# Patient Record
Sex: FEMALE | Race: WHITE | NOT HISPANIC OR LATINO | Employment: OTHER | URBAN - METROPOLITAN AREA
[De-identification: names, ages, dates, MRNs, and addresses within clinical notes are randomized per-mention and may not be internally consistent; named-entity substitution may affect disease eponyms.]

---

## 2017-02-08 ENCOUNTER — OFFICE VISIT (OUTPATIENT)
Dept: CARDIOLOGY | Facility: CLINIC | Age: 77
End: 2017-02-08

## 2017-02-08 VITALS
BODY MASS INDEX: 34.56 KG/M2 | HEIGHT: 68 IN | HEART RATE: 68 BPM | SYSTOLIC BLOOD PRESSURE: 128 MMHG | DIASTOLIC BLOOD PRESSURE: 82 MMHG | WEIGHT: 228 LBS

## 2017-02-08 DIAGNOSIS — I25.9 ISCHEMIC HEART DISEASE: Primary | ICD-10-CM

## 2017-02-08 PROCEDURE — 99213 OFFICE O/P EST LOW 20 MIN: CPT | Performed by: INTERNAL MEDICINE

## 2017-02-08 PROCEDURE — 93000 ELECTROCARDIOGRAM COMPLETE: CPT | Performed by: INTERNAL MEDICINE

## 2017-02-08 NOTE — PROGRESS NOTES
Date of Office Visit: 2017  Encounter Provider: Chintan Sinha MD  Place of Service: [facility] CARDIOLOGY  Patient Name: Mellisa Sheldon  :1940      Chief Complaint   Patient presents with   • Coronary Artery Disease     History of Present Illness  The patient is a 76-year-old white female with a history of coronary artery disease.  His had previous angioplasty and drug-eluting stent to the right coronary artery and a bare metal stent to the left anterior descending coronary artery.  Her overall left ventricular function is normal.  Since her last visit a year ago she has not had any complaints of angina pectoris.  She's had several falls resulting in injuries but none of these were associated with any syncopal or near syncopal events.  She generally does not complain of any palpitations.  She does not complain of lightheadedness nor dizziness.  However she does state that she had dizzy spells when she was taking atorvastatin and since she stopped this several longer an issue.      Past Medical History   Diagnosis Date   • GERD (gastroesophageal reflux disease)    • Hyperlipidemia    • Ischemic cardiomyopathy    • Myocardial infarction          Past Surgical History   Procedure Laterality Date   • Coronary angioplasty       angioplasties of the LAD and the right coronary artery both with stent placements           Current Outpatient Prescriptions:   •  aspirin 325 MG tablet, Take by mouth daily., Disp: , Rfl:   •  atorvastatin (LIPITOR) 20 MG tablet, Take 1 tablet by mouth nightly., Disp: , Rfl:   •  carvedilol (COREG) 6.25 MG tablet, TAKE ONE TABLET BY MOUTH TWICE A DAY, Disp: 60 tablet, Rfl: 4  •  isosorbide mononitrate (IMDUR) 30 MG 24 hr tablet, TAKE ONE TABLET BY MOUTH DAILY, Disp: 30 tablet, Rfl: 5  •  Multiple Vitamins-Minerals (PRESERVISION AREDS PO), Take  by mouth., Disp: , Rfl:   •  nitroglycerin (NITROSTAT) 0.4 MG SL tablet, Place under the tongue.,  "Disp: , Rfl:   •  pantoprazole (PROTONIX) 20 MG EC tablet, Take 1 tablet by mouth 2 (two) times a day. WILL NEED TO GET FUTURE REFILLS THROUGH PCP., Disp: 60 tablet, Rfl: 0  •  Pyridoxine HCl (VITAMIN B-6 PO), Take by mouth daily., Disp: , Rfl:       Social History     Social History   • Marital status:      Spouse name: N/A   • Number of children: N/A   • Years of education: N/A     Occupational History   • Not on file.     Social History Main Topics   • Smoking status: Former Smoker     Packs/day: 2.00     Years: 40.00     Types: Cigarettes     Quit date: 10/14/2009   • Smokeless tobacco: Never Used   • Alcohol use No   • Drug use: No   • Sexual activity: Not on file     Other Topics Concern   • Not on file     Social History Narrative         Review of Systems   Constitution: Positive for malaise/fatigue.   HENT: Negative.    Eyes: Negative.    Cardiovascular: Negative.    Respiratory: Negative.    Endocrine: Negative.    Skin: Negative.    Musculoskeletal: Negative.    Gastrointestinal: Negative.    Neurological: Negative.    Psychiatric/Behavioral: Negative.        Procedures      ECG 12 Lead  Date/Time: 2/8/2017 12:39 PM  Performed by: RASHIDA STOCKTON  Authorized by: RASHIDA STOCKTON   Comparison: compared with previous ECG from 2/4/2016  Similar to previous ECG  Rhythm: sinus rhythm  Rate: normal  QRS axis: normal  Other findings: PRWP               Objective:      Visit Vitals   • /82   • Pulse 68   • Ht 68\" (172.7 cm)   • Wt 228 lb (103 kg)   • BMI 34.67 kg/m2           Physical Exam   Constitutional: She is oriented to person, place, and time. She appears well-developed and well-nourished.   HENT:   Head: Normocephalic.   Eyes: Pupils are equal, round, and reactive to light.   Neck: Normal range of motion. No JVD present. Carotid bruit is not present. No thyromegaly present.   Cardiovascular: Normal rate, regular rhythm, S1 normal, S2 normal, normal heart sounds and intact distal " pulses.  Exam reveals no gallop and no friction rub.    No murmur heard.  Pulmonary/Chest: Effort normal and breath sounds normal.   Abdominal: Soft. Bowel sounds are normal.   Musculoskeletal: She exhibits no edema.   Neurological: She is alert and oriented to person, place, and time.   Skin: Skin is warm, dry and intact. No erythema.   Psychiatric: She has a normal mood and affect.   Vitals reviewed.          Assessment:       Diagnosis Plan   1. Ischemic heart disease       Coronary Artery Disease  Assessment  • The patient has no angina    Subjective - Objective  • There has been a previous stent procedure using DRE RCA  • There has been a previous stent procedure using BMS LAD             Plan:    No change in medication.  The patient will follow back up in a year

## 2017-03-21 RX ORDER — ISOSORBIDE MONONITRATE 30 MG/1
TABLET, EXTENDED RELEASE ORAL
Qty: 30 TABLET | Refills: 5 | Status: SHIPPED | OUTPATIENT
Start: 2017-03-21 | End: 2017-09-29 | Stop reason: SDUPTHER

## 2017-03-21 RX ORDER — CARVEDILOL 6.25 MG/1
TABLET ORAL
Qty: 60 TABLET | Refills: 5 | Status: SHIPPED | OUTPATIENT
Start: 2017-03-21 | End: 2017-09-29 | Stop reason: SDUPTHER

## 2017-10-02 RX ORDER — ISOSORBIDE MONONITRATE 30 MG/1
TABLET, EXTENDED RELEASE ORAL
Qty: 30 TABLET | Refills: 5 | Status: SHIPPED | OUTPATIENT
Start: 2017-10-02 | End: 2018-04-03 | Stop reason: SDUPTHER

## 2017-10-02 RX ORDER — CARVEDILOL 6.25 MG/1
TABLET ORAL
Qty: 60 TABLET | Refills: 5 | Status: SHIPPED | OUTPATIENT
Start: 2017-10-02 | End: 2018-04-03 | Stop reason: SDUPTHER

## 2017-12-22 ENCOUNTER — APPOINTMENT (OUTPATIENT)
Dept: WOMENS IMAGING | Facility: HOSPITAL | Age: 77
End: 2017-12-22

## 2017-12-22 PROCEDURE — G0202 SCR MAMMO BI INCL CAD: HCPCS | Performed by: RADIOLOGY

## 2017-12-22 PROCEDURE — G0279 TOMOSYNTHESIS, MAMMO: HCPCS | Performed by: RADIOLOGY

## 2017-12-22 PROCEDURE — 77063 BREAST TOMOSYNTHESIS BI: CPT | Performed by: RADIOLOGY

## 2018-02-08 ENCOUNTER — OFFICE VISIT (OUTPATIENT)
Dept: CARDIOLOGY | Facility: CLINIC | Age: 78
End: 2018-02-08

## 2018-02-08 VITALS
BODY MASS INDEX: 34.56 KG/M2 | HEIGHT: 68 IN | DIASTOLIC BLOOD PRESSURE: 82 MMHG | WEIGHT: 228 LBS | SYSTOLIC BLOOD PRESSURE: 124 MMHG | HEART RATE: 62 BPM

## 2018-02-08 DIAGNOSIS — I25.9 ISCHEMIC HEART DISEASE: Primary | ICD-10-CM

## 2018-02-08 PROCEDURE — 99213 OFFICE O/P EST LOW 20 MIN: CPT | Performed by: INTERNAL MEDICINE

## 2018-02-08 PROCEDURE — 93000 ELECTROCARDIOGRAM COMPLETE: CPT | Performed by: INTERNAL MEDICINE

## 2018-02-08 NOTE — PROGRESS NOTES
Date of Office Visit: 2018  Encounter Provider: Chintan Sinha MD  [facility] of Service[facility]  Patient Name: Mellisa Sheldon  :1940      Chief Complaint   Patient presents with   • ISCHEMIC HEART DISEASE   • Shortness of Breath     History of Present Illness    The patient is a 27-year-old white female with a history of coronary artery disease.  She has had previous angioplasty with a drug-eluting stent to the right coronary artery and a bare metal stent placed to the left anterior descending coronary artery her left ventricular function overall has remained normal.  She does not complain of any angina pectoris at this time.  In fact she states that she has been feeling reasonably well.  She denies any orthopnea, paroxysmal nocturnal dyspnea, lightheadedness nor dizziness.    Past Medical History:   Diagnosis Date   • GERD (gastroesophageal reflux disease)    • Hyperlipidemia    • Ischemic cardiomyopathy    • Myocardial infarction          Past Surgical History:   Procedure Laterality Date   • CORONARY ANGIOPLASTY      angioplasties of the LAD and the right coronary artery both with stent placements           Current Outpatient Prescriptions:   •  aspirin 325 MG tablet, Take by mouth daily., Disp: , Rfl:   •  carvedilol (COREG) 6.25 MG tablet, TAKE ONE TABLET BY MOUTH TWICE A DAY, Disp: 60 tablet, Rfl: 5  •  isosorbide mononitrate (IMDUR) 30 MG 24 hr tablet, TAKE ONE TABLET BY MOUTH DAILY, Disp: 30 tablet, Rfl: 5  •  Multiple Vitamins-Minerals (PRESERVISION AREDS PO), Take  by mouth., Disp: , Rfl:   •  nitroglycerin (NITROSTAT) 0.4 MG SL tablet, Place under the tongue., Disp: , Rfl:   •  pantoprazole (PROTONIX) 20 MG EC tablet, Take 1 tablet by mouth 2 (two) times a day. WILL NEED TO GET FUTURE REFILLS THROUGH PCP., Disp: 60 tablet, Rfl: 0  •  Pyridoxine HCl (VITAMIN B-6 PO), Take by mouth daily., Disp: , Rfl:       Social History     Social History   •  "Marital status:      Spouse name: N/A   • Number of children: N/A   • Years of education: N/A     Occupational History   • Not on file.     Social History Main Topics   • Smoking status: Former Smoker     Packs/day: 2.00     Years: 40.00     Types: Cigarettes     Quit date: 10/14/2009   • Smokeless tobacco: Never Used   • Alcohol use No   • Drug use: No   • Sexual activity: Not on file     Other Topics Concern   • Not on file     Social History Narrative         Review of Systems   Constitution: Negative.   HENT: Negative.    Eyes: Negative.    Cardiovascular: Positive for dyspnea on exertion.   Respiratory: Negative.    Endocrine: Negative.    Skin: Negative.    Musculoskeletal: Negative.    Gastrointestinal: Negative.    Neurological: Negative.    Psychiatric/Behavioral: Negative.        Procedures      ECG 12 Lead  Date/Time: 2/8/2018 1:04 PM  Performed by: RASHIDA STOCKTON  Authorized by: RASHIDA STOCKTON   Comparison: compared with previous ECG from 2/8/2017  Similar to previous ECG  Rhythm: sinus rhythm  Rate: normal  Conduction: conduction normal  QRS axis: normal  Other findings: PRWP               Objective:    /82 (BP Location: Right arm, Patient Position: Sitting)  Pulse 62  Ht 172.7 cm (68\")  Wt 103 kg (228 lb)  BMI 34.67 kg/m2        Physical Exam   Constitutional: She is oriented to person, place, and time. She appears well-developed and well-nourished.   HENT:   Head: Normocephalic.   Eyes: Pupils are equal, round, and reactive to light.   Neck: Normal range of motion. No JVD present. Carotid bruit is not present. No thyromegaly present.   Cardiovascular: Normal rate, regular rhythm, S1 normal, S2 normal, normal heart sounds and intact distal pulses.  Exam reveals no gallop and no friction rub.    No murmur heard.  Pulmonary/Chest: Effort normal and breath sounds normal.   Abdominal: Soft. Bowel sounds are normal.   Musculoskeletal: She exhibits no edema.   Neurological: She is " alert and oriented to person, place, and time.   Skin: Skin is warm, dry and intact. No erythema.   Psychiatric: She has a normal mood and affect.   Vitals reviewed.          Assessment:       Diagnosis Plan   1. Ischemic heart disease       Coronary Artery Disease  Assessment  • The patient has no angina    Plan  • Lifestyle modifications discussed include adhering to a heart healthy diet, regular exercise and regular monitoring of cholesterol and blood pressure    Subjective - Objective  • There has been a previous stent procedure using DRE RCA  • There has been a previous stent procedure using BMS LAD  • Current antiplatelet therapy includes aspirin 81 mg           Plan:     No change at this time.  I will see her back in a year.

## 2018-04-03 RX ORDER — ISOSORBIDE MONONITRATE 30 MG/1
TABLET, EXTENDED RELEASE ORAL
Qty: 30 TABLET | Refills: 11 | Status: SHIPPED | OUTPATIENT
Start: 2018-04-03 | End: 2019-04-05 | Stop reason: SDUPTHER

## 2018-04-03 RX ORDER — CARVEDILOL 6.25 MG/1
TABLET ORAL
Qty: 60 TABLET | Refills: 11 | Status: SHIPPED | OUTPATIENT
Start: 2018-04-03 | End: 2019-04-19 | Stop reason: SDUPTHER

## 2018-12-28 ENCOUNTER — APPOINTMENT (OUTPATIENT)
Dept: WOMENS IMAGING | Facility: HOSPITAL | Age: 78
End: 2018-12-28

## 2018-12-28 PROCEDURE — 77063 BREAST TOMOSYNTHESIS BI: CPT | Performed by: RADIOLOGY

## 2018-12-28 PROCEDURE — 77067 SCR MAMMO BI INCL CAD: CPT | Performed by: RADIOLOGY

## 2019-02-13 ENCOUNTER — OFFICE VISIT (OUTPATIENT)
Dept: CARDIOLOGY | Facility: CLINIC | Age: 79
End: 2019-02-13

## 2019-02-13 VITALS
HEIGHT: 68 IN | HEART RATE: 87 BPM | WEIGHT: 234.4 LBS | SYSTOLIC BLOOD PRESSURE: 142 MMHG | DIASTOLIC BLOOD PRESSURE: 76 MMHG | BODY MASS INDEX: 35.52 KG/M2 | OXYGEN SATURATION: 97 %

## 2019-02-13 DIAGNOSIS — I25.10 CORONARY ARTERY DISEASE INVOLVING NATIVE CORONARY ARTERY OF NATIVE HEART WITHOUT ANGINA PECTORIS: Primary | ICD-10-CM

## 2019-02-13 DIAGNOSIS — E78.5 HYPERLIPIDEMIA, UNSPECIFIED HYPERLIPIDEMIA TYPE: ICD-10-CM

## 2019-02-13 DIAGNOSIS — I10 ESSENTIAL HYPERTENSION: ICD-10-CM

## 2019-02-13 DIAGNOSIS — E66.09 CLASS 2 OBESITY DUE TO EXCESS CALORIES WITHOUT SERIOUS COMORBIDITY WITH BODY MASS INDEX (BMI) OF 36.0 TO 36.9 IN ADULT: ICD-10-CM

## 2019-02-13 PROCEDURE — 93000 ELECTROCARDIOGRAM COMPLETE: CPT | Performed by: INTERNAL MEDICINE

## 2019-02-13 PROCEDURE — 99214 OFFICE O/P EST MOD 30 MIN: CPT | Performed by: INTERNAL MEDICINE

## 2019-02-13 RX ORDER — ATORVASTATIN CALCIUM 20 MG/1
1 TABLET, FILM COATED ORAL DAILY
COMMUNITY
Start: 2019-01-26 | End: 2022-08-24 | Stop reason: SDUPTHER

## 2019-02-13 NOTE — PROGRESS NOTES
Date of Office Visit: 2019  Encounter Provider: Chintan Sinha MD  Place of Service: [facility] CARDIOLOGY  Patient Name: Mellisa Sheldon  :1940      Chief Complaint   Patient presents with   • Coronary Artery Disease     History of Present Illness    The patient is a 78-year-old white female with a history of coronary artery disease.  She has had previous angioplasty with a drug-eluting stent to the right coronary artery as well as a bare-metal stent to the LAD.  Her left ventricular function remains normal.    The patient does not complain of angina pectoris.  She denies any palpitations with the exception of one event.  She does not particularly complain of fatigue issues.  She does have some shortness of breath with exertion.  Much of this is probably due to her physical inactivity and obesity.  Past Medical History:   Diagnosis Date   • GERD (gastroesophageal reflux disease)    • Hyperlipidemia    • Ischemic cardiomyopathy    • Myocardial infarction (CMS/HCC)          Past Surgical History:   Procedure Laterality Date   • CORONARY ANGIOPLASTY      angioplasties of the LAD and the right coronary artery both with stent placements           Current Outpatient Medications:   •  aspirin 325 MG tablet, Take by mouth daily., Disp: , Rfl:   •  atorvastatin (LIPITOR) 20 MG tablet, Take 1 tablet by mouth Daily., Disp: , Rfl:   •  carvedilol (COREG) 6.25 MG tablet, TAKE ONE TABLET BY MOUTH TWICE A DAY, Disp: 60 tablet, Rfl: 11  •  isosorbide mononitrate (IMDUR) 30 MG 24 hr tablet, TAKE ONE TABLET BY MOUTH DAILY, Disp: 30 tablet, Rfl: 11  •  Multiple Vitamins-Minerals (PRESERVISION AREDS PO), Take  by mouth., Disp: , Rfl:   •  nitroglycerin (NITROSTAT) 0.4 MG SL tablet, Place under the tongue., Disp: , Rfl:   •  pantoprazole (PROTONIX) 20 MG EC tablet, Take 1 tablet by mouth 2 (two) times a day. WILL NEED TO GET FUTURE REFILLS THROUGH PCP., Disp: 60 tablet, Rfl: 0  •   "Pyridoxine HCl (VITAMIN B-6 PO), Take by mouth daily., Disp: , Rfl:       Social History     Socioeconomic History   • Marital status:      Spouse name: Not on file   • Number of children: Not on file   • Years of education: Not on file   • Highest education level: Not on file   Social Needs   • Financial resource strain: Not on file   • Food insecurity - worry: Not on file   • Food insecurity - inability: Not on file   • Transportation needs - medical: Not on file   • Transportation needs - non-medical: Not on file   Occupational History   • Not on file   Tobacco Use   • Smoking status: Former Smoker     Packs/day: 2.00     Years: 40.00     Pack years: 80.00     Types: Cigarettes     Last attempt to quit: 10/14/2009     Years since quittin.3   • Smokeless tobacco: Never Used   • Tobacco comment: caffeine use   Substance and Sexual Activity   • Alcohol use: No   • Drug use: No   • Sexual activity: Not on file   Other Topics Concern   • Not on file   Social History Narrative   • Not on file         Review of Systems   Constitution: Negative.   HENT: Negative.    Eyes: Negative.    Cardiovascular: Positive for chest pain (  Rare) and palpitations (Rare).   Respiratory: Negative.    Endocrine: Negative.    Skin: Negative.    Musculoskeletal: Negative.    Gastrointestinal: Negative.    Neurological: Negative.    Psychiatric/Behavioral: Negative.        Procedures      ECG 12 Lead  Date/Time: 2019 12:23 PM  Performed by: Chintan Sinha MD  Authorized by: Chintan Sinha MD   Comparison: compared with previous ECG from 2018  Similar to previous ECG  Rhythm: sinus rhythm  Rate: normal  Conduction: conduction normal  QRS axis: normal  Comments: Nonspecific ST-T wave changes                Objective:    /76 (BP Location: Left arm, Patient Position: Sitting, Cuff Size: Large Adult)   Pulse 87   Ht 171.5 cm (67.5\")   Wt 106 kg (234 lb 6.4 oz)   SpO2 97%   BMI 36.17 kg/m² "         Physical Exam   Constitutional: She is oriented to person, place, and time. She appears well-developed and well-nourished.   HENT:   Head: Normocephalic.   Eyes: Pupils are equal, round, and reactive to light.   Neck: Normal range of motion. No JVD present. Carotid bruit is not present. No thyromegaly present.   Cardiovascular: Normal rate, regular rhythm, S1 normal, S2 normal, normal heart sounds and intact distal pulses. Exam reveals no gallop and no friction rub.   No murmur heard.  Pulmonary/Chest: Effort normal and breath sounds normal.   Abdominal: Soft. Bowel sounds are normal.   Musculoskeletal: She exhibits no edema.   Neurological: She is alert and oriented to person, place, and time.   Skin: Skin is warm, dry and intact. No erythema.   Psychiatric: She has a normal mood and affect.   Vitals reviewed.          Assessment:       Diagnosis Plan   1. Coronary artery disease involving native coronary artery of native heart without angina pectoris     2. Hyperlipidemia, unspecified hyperlipidemia type     3. Essential hypertension     4. Class 2 obesity due to excess calories without serious comorbidity with body mass index (BMI) of 36.0 to 36.9 in adult         1. Coronary Artery Disease  Assessment  • The patient has no angina    Plan  • Lifestyle modifications discussed include maintenance of a healthy weight, regular exercise and regular monitoring of cholesterol and blood pressure    Subjective - Objective  • There has been a previous stent procedure using DRE RCA  • There has been a previous stent procedure using BMS LAD  • Current antiplatelet therapy includes aspirin 81 mg      2.  Hypertension: Stable  3.  Hyperlipidemia: On medical therapy  4.  Obesity: Physical activity and dietary measures emphasized.  Plan:

## 2019-04-10 RX ORDER — ISOSORBIDE MONONITRATE 30 MG/1
TABLET, EXTENDED RELEASE ORAL
Qty: 30 TABLET | Refills: 10 | Status: SHIPPED | OUTPATIENT
Start: 2019-04-10 | End: 2020-03-12

## 2019-04-19 RX ORDER — CARVEDILOL 6.25 MG/1
TABLET ORAL
Qty: 60 TABLET | Refills: 10 | Status: SHIPPED | OUTPATIENT
Start: 2019-04-19 | End: 2020-03-23

## 2020-02-10 ENCOUNTER — APPOINTMENT (OUTPATIENT)
Dept: WOMENS IMAGING | Facility: HOSPITAL | Age: 80
End: 2020-02-10

## 2020-02-10 PROCEDURE — 77067 SCR MAMMO BI INCL CAD: CPT | Performed by: RADIOLOGY

## 2020-02-10 PROCEDURE — 77063 BREAST TOMOSYNTHESIS BI: CPT | Performed by: RADIOLOGY

## 2020-02-25 ENCOUNTER — OFFICE VISIT (OUTPATIENT)
Dept: CARDIOLOGY | Facility: CLINIC | Age: 80
End: 2020-02-25

## 2020-02-25 VITALS
WEIGHT: 229.4 LBS | OXYGEN SATURATION: 99 % | BODY MASS INDEX: 36 KG/M2 | DIASTOLIC BLOOD PRESSURE: 84 MMHG | HEART RATE: 83 BPM | SYSTOLIC BLOOD PRESSURE: 148 MMHG | HEIGHT: 67 IN

## 2020-02-25 DIAGNOSIS — I25.9 ISCHEMIC HEART DISEASE: Primary | ICD-10-CM

## 2020-02-25 DIAGNOSIS — R00.2 PALPITATIONS: ICD-10-CM

## 2020-02-25 PROCEDURE — 93000 ELECTROCARDIOGRAM COMPLETE: CPT | Performed by: INTERNAL MEDICINE

## 2020-02-25 PROCEDURE — 99214 OFFICE O/P EST MOD 30 MIN: CPT | Performed by: INTERNAL MEDICINE

## 2020-02-25 NOTE — PROGRESS NOTES
Date of Office Visit: 2020    Patient Name: Mellisa Sheldon  : 1940    Encounter Provider: Chintan Sinha MD  Referring Provider: No ref. provider found  Place of Service: [facility] CARDIOLOGY  Patient Care Team:  Marcial Kwon MD as PCP - General (Family Medicine)  Marcial Kwon MD as PCP - Claims Attributed      Chief Complaint   Patient presents with   • Coronary Artery Disease     1 yr follow up     History of Present Illness    The patient is an 79-year-old white female with a history of coronary artery disease as well as hypertension who returns to the office today for a follow-up.  The patient states that in the last few months she has had several episodes of tachycardia.  The episodes occur abruptly without any warning.  They can last for well over an hour.  She does feel a little uncomfortable on her chest basically noting the tachyarrhythmia but does not eyes any chest pain or shortness of breath associated with it.  The episodes then spontaneously dissipate.  There is no provoking episode that she is noted.  There is nothing that precipitates the event nor relieves it.  The last event she had was about a month ago.    The patient does not complain of any angina pectoris.  She does have some exertional dyspnea.  She is starting to become a little bit more active physically and thus she notices more symptoms of dyspnea.    Past Medical History:   Diagnosis Date   • GERD (gastroesophageal reflux disease)    • Hyperlipidemia    • Ischemic cardiomyopathy    • Myocardial infarction (CMS/HCC)          Past Surgical History:   Procedure Laterality Date   • CORONARY ANGIOPLASTY      angioplasties of the LAD and the right coronary artery both with stent placements           Current Outpatient Medications:   •  aspirin 325 MG tablet, Take by mouth daily., Disp: , Rfl:   •  atorvastatin (LIPITOR) 20 MG tablet, Take 1 tablet by mouth Daily.,  Disp: , Rfl:   •  carvedilol (COREG) 6.25 MG tablet, TAKE ONE TABLET BY MOUTH TWICE A DAY, Disp: 60 tablet, Rfl: 10  •  isosorbide mononitrate (IMDUR) 30 MG 24 hr tablet, TAKE ONE TABLET BY MOUTH DAILY, Disp: 30 tablet, Rfl: 10  •  Multiple Vitamins-Minerals (PRESERVISION AREDS PO), Take  by mouth., Disp: , Rfl:   •  nitroglycerin (NITROSTAT) 0.4 MG SL tablet, Place under the tongue., Disp: , Rfl:   •  pantoprazole (PROTONIX) 20 MG EC tablet, Take 1 tablet by mouth 2 (two) times a day. WILL NEED TO GET FUTURE REFILLS THROUGH PCP., Disp: 60 tablet, Rfl: 0  •  Pyridoxine HCl (VITAMIN B-6 PO), Take by mouth daily., Disp: , Rfl:       Social History     Socioeconomic History   • Marital status:      Spouse name: Not on file   • Number of children: Not on file   • Years of education: Not on file   • Highest education level: Not on file   Tobacco Use   • Smoking status: Former Smoker     Packs/day: 2.00     Years: 40.00     Pack years: 80.00     Types: Cigarettes     Last attempt to quit: 10/14/2009     Years since quitting: 10.3   • Smokeless tobacco: Never Used   • Tobacco comment: caffeine use   Substance and Sexual Activity   • Alcohol use: No   • Drug use: No         Review of Systems   Constitution: Negative.   HENT: Negative.    Eyes: Negative.    Cardiovascular: Positive for dyspnea on exertion, leg swelling and palpitations.   Respiratory: Negative.    Endocrine: Negative.    Skin: Negative.    Musculoskeletal: Negative.    Gastrointestinal: Negative.    Neurological: Negative.    Psychiatric/Behavioral: Negative.        Procedures      ECG 12 Lead  Date/Time: 2/25/2020 10:49 AM  Performed by: Chintan Sinha MD  Authorized by: Chintan Sinha MD   Comparison: compared with previous ECG from 2/13/2019  Similar to previous ECG  Rhythm: sinus rhythm  Rate: normal  Conduction: conduction normal  QRS axis: normal                  Objective:    /84 (BP Location: Right arm, Patient Position:  "Sitting, Cuff Size: Large Adult)   Pulse 83   Ht 170.2 cm (67\")   Wt 104 kg (229 lb 6.4 oz)   SpO2 99%   BMI 35.93 kg/m²         Physical Exam   Constitutional: She is oriented to person, place, and time. She appears well-developed and well-nourished.   HENT:   Head: Normocephalic.   Eyes: Pupils are equal, round, and reactive to light.   Neck: Normal range of motion. No JVD present. Carotid bruit is not present. No thyromegaly present.   Cardiovascular: Normal rate, regular rhythm, S1 normal, S2 normal, normal heart sounds and intact distal pulses. Exam reveals no gallop and no friction rub.   No murmur heard.  Pulmonary/Chest: Effort normal and breath sounds normal.   Abdominal: Soft. Bowel sounds are normal.   Musculoskeletal: She exhibits no edema.   Neurological: She is alert and oriented to person, place, and time.   Skin: Skin is warm, dry and intact. No erythema.   Psychiatric: She has a normal mood and affect.   Vitals reviewed.          Assessment:       Diagnosis Plan   1. Ischemic heart disease     2. Palpitations       1.  Coronary artery disease: Patient is status post previous myocardial infarction.  She has had a drug stent to the RCA and a bare-metal stent to the LAD.  No angina pectoris  2.  Palpitations: I suspect she is having an atrial dysrhythmia not ventricular based on her presentation.  I have asked that she call me and go to an emergency room if the event happens again.  We talked about different monitoring.  3.  Hypertension: Borderline elevated today.  Continue to monitor  4.  Hyperlipidemia: On statin therapy       Plan:         "

## 2020-03-12 RX ORDER — ISOSORBIDE MONONITRATE 30 MG/1
TABLET, EXTENDED RELEASE ORAL
Qty: 30 TABLET | Refills: 9 | Status: SHIPPED | OUTPATIENT
Start: 2020-03-12 | End: 2020-12-31

## 2020-03-23 RX ORDER — CARVEDILOL 6.25 MG/1
TABLET ORAL
Qty: 60 TABLET | Refills: 10 | Status: SHIPPED | OUTPATIENT
Start: 2020-03-23 | End: 2021-10-11 | Stop reason: SDUPTHER

## 2020-08-05 ENCOUNTER — OFFICE VISIT (OUTPATIENT)
Dept: CARDIOLOGY | Facility: CLINIC | Age: 80
End: 2020-08-05

## 2020-08-05 ENCOUNTER — TELEPHONE (OUTPATIENT)
Dept: CARDIOLOGY | Facility: CLINIC | Age: 80
End: 2020-08-05

## 2020-08-05 VITALS
WEIGHT: 229.6 LBS | HEIGHT: 68 IN | OXYGEN SATURATION: 98 % | DIASTOLIC BLOOD PRESSURE: 84 MMHG | HEART RATE: 86 BPM | BODY MASS INDEX: 34.8 KG/M2 | SYSTOLIC BLOOD PRESSURE: 148 MMHG

## 2020-08-05 DIAGNOSIS — I25.10 CORONARY ARTERY DISEASE INVOLVING NATIVE CORONARY ARTERY OF NATIVE HEART WITHOUT ANGINA PECTORIS: ICD-10-CM

## 2020-08-05 DIAGNOSIS — R20.0 BILATERAL HAND NUMBNESS: Primary | ICD-10-CM

## 2020-08-05 PROCEDURE — 93000 ELECTROCARDIOGRAM COMPLETE: CPT | Performed by: INTERNAL MEDICINE

## 2020-08-05 PROCEDURE — 99213 OFFICE O/P EST LOW 20 MIN: CPT | Performed by: INTERNAL MEDICINE

## 2020-08-05 RX ORDER — CHLORAL HYDRATE 500 MG
2000 CAPSULE ORAL
COMMUNITY

## 2020-08-05 NOTE — PROGRESS NOTES
Date of Office Visit: 2020    Patient Name: Mellisa Sheldon  : 1940    Encounter Provider: Chintan Sinha MD  Referring Provider: No ref. provider found  Place of Service: [facility] CARDIOLOGY  Patient Care Team:  Marcial Kwon MD as PCP - General (Family Medicine)  Chintan Sinha MD as PCP - Claims Attributed      Chief Complaint   Patient presents with   • Coronary Artery Disease     numbness in hands     History of Present Illness  The patient is an 79-year-old white walked in the office today without an appointment complaining of severe hand numbness.  Her daughters were insistent that she be seen by me today.  I was able to accommodate the patient fortunately.    Patient has a history of coronary disease as well as hypertension.  She also complains of palpitations intermittently.  The big complaint that she has today is bilateral hand numbness no matter what she does.  She does not complain of any chest pain.  She denies any shortness of breath.    From a cardiac standpoint she has had intervention to both the right coronary left anterior descending.  She does not have to use any sublingual nitroglycerin.    Recently she saw Dr. Chon Mancilla and had a CT scan severe degenerative disease and spinal compression.  I believe this is probably the basis of her hand numbness.  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      Past Medical History:   Diagnosis Date   • GERD (gastroesophageal reflux disease)    • Hyperlipidemia    • Ischemic cardiomyopathy    • Myocardial infarction (CMS/HCC)          Past Surgical History:   Procedure Laterality Date   • CORONARY ANGIOPLASTY      angioplasties of the LAD and the right coronary artery both with stent placements           Current Outpatient Medications:   •  aspirin 325 MG tablet, Take by mouth daily., Disp: , Rfl:   •  atorvastatin (LIPITOR) 20 MG tablet,  Take 1 tablet by mouth Daily., Disp: , Rfl:   •  carvedilol (COREG) 6.25 MG tablet, TAKE ONE TABLET BY MOUTH TWICE A DAY, Disp: 60 tablet, Rfl: 10  •  isosorbide mononitrate (IMDUR) 30 MG 24 hr tablet, TAKE ONE TABLET BY MOUTH DAILY, Disp: 30 tablet, Rfl: 9  •  Multiple Vitamins-Minerals (PRESERVISION AREDS PO), Take  by mouth., Disp: , Rfl:   •  nitroglycerin (NITROSTAT) 0.4 MG SL tablet, Place under the tongue., Disp: , Rfl:   •  Omega-3 Fatty Acids (FISH OIL) 1000 MG capsule capsule, Take  by mouth Daily With Breakfast., Disp: , Rfl:   •  pantoprazole (PROTONIX) 20 MG EC tablet, Take 1 tablet by mouth 2 (two) times a day. WILL NEED TO GET FUTURE REFILLS THROUGH PCP., Disp: 60 tablet, Rfl: 0  •  Pyridoxine HCl (VITAMIN B-6 PO), Take by mouth daily., Disp: , Rfl:       Social History     Socioeconomic History   • Marital status:      Spouse name: Not on file   • Number of children: Not on file   • Years of education: Not on file   • Highest education level: Not on file   Tobacco Use   • Smoking status: Former Smoker     Packs/day: 2.00     Years: 40.00     Pack years: 80.00     Types: Cigarettes     Last attempt to quit: 10/14/2009     Years since quitting: 10.8   • Smokeless tobacco: Never Used   • Tobacco comment: caffeine use   Substance and Sexual Activity   • Alcohol use: No   • Drug use: No         Review of Systems   Constitution: Negative.   HENT: Negative.    Eyes: Negative.    Cardiovascular: Negative.    Respiratory: Negative.    Endocrine: Negative.    Skin: Negative.    Musculoskeletal: Negative.    Gastrointestinal: Negative.    Neurological: Positive for numbness (Bilateral hand).   Psychiatric/Behavioral: Negative.        Procedures      ECG 12 Lead  Date/Time: 8/5/2020 2:14 PM  Performed by: Chintan Sinha MD  Authorized by: Chintan Sinha MD   Comparison: compared with previous ECG from 2/20/2020  Rhythm: sinus rhythm  Rate: normal                Objective:    /84 (BP  "Location: Right arm, Patient Position: Sitting, Cuff Size: Large Adult)   Pulse 86   Ht 172.7 cm (68\")   Wt 104 kg (229 lb 9.6 oz)   SpO2 98%   BMI 34.91 kg/m²         Physical Exam   Constitutional: She is oriented to person, place, and time. She appears well-developed and well-nourished.   Overweight   HENT:   Head: Normocephalic.   Eyes: Pupils are equal, round, and reactive to light.   Neck: Normal range of motion. No JVD present. Carotid bruit is not present. No thyromegaly present.   Cardiovascular: Normal rate, regular rhythm, S1 normal, S2 normal, normal heart sounds and intact distal pulses. Exam reveals no gallop and no friction rub.   No murmur heard.  Pulses:       Carotid pulses are 2+ on the right side, and 2+ on the left side.       Radial pulses are 2+ on the right side, and 2+ on the left side.        Dorsalis pedis pulses are 2+ on the right side, and 2+ on the left side.        Posterior tibial pulses are 2+ on the right side, and 2+ on the left side.   Pulmonary/Chest: Effort normal and breath sounds normal.   Abdominal: Soft. Bowel sounds are normal.   Musculoskeletal: She exhibits no edema.   Neurological: She is alert and oriented to person, place, and time.   Skin: Skin is warm, dry and intact. No erythema.   Psychiatric: She has a normal mood and affect.   Vitals reviewed.          Assessment:       Diagnosis Plan   1. Bilateral hand numbness     2. Coronary artery disease involving native coronary artery of native heart without angina pectoris         Think the patient has significant spinal stenosis.  She has an appointment with the neurosurgeon back in a few days.  I am sure he will review everything with her and talk to her about options.  I do not believe that her numbness is cardiac related    Coronary artery disease: No angina pectoris at this time.  Hypertension: Reasonably well controlled     Plan:         "

## 2020-12-31 RX ORDER — ISOSORBIDE MONONITRATE 30 MG/1
TABLET, EXTENDED RELEASE ORAL
Qty: 30 TABLET | Refills: 3 | Status: SHIPPED | OUTPATIENT
Start: 2020-12-31 | End: 2021-05-03

## 2021-05-03 RX ORDER — ISOSORBIDE MONONITRATE 30 MG/1
TABLET, EXTENDED RELEASE ORAL
Qty: 30 TABLET | Refills: 2 | Status: SHIPPED | OUTPATIENT
Start: 2021-05-03 | End: 2021-07-29

## 2021-07-29 RX ORDER — ISOSORBIDE MONONITRATE 30 MG/1
TABLET, EXTENDED RELEASE ORAL
Qty: 30 TABLET | Refills: 2 | Status: SHIPPED | OUTPATIENT
Start: 2021-07-29 | End: 2021-10-28 | Stop reason: SDUPTHER

## 2021-08-26 ENCOUNTER — OFFICE VISIT (OUTPATIENT)
Dept: CARDIOLOGY | Facility: CLINIC | Age: 81
End: 2021-08-26

## 2021-08-26 VITALS
OXYGEN SATURATION: 98 % | DIASTOLIC BLOOD PRESSURE: 84 MMHG | SYSTOLIC BLOOD PRESSURE: 126 MMHG | BODY MASS INDEX: 35.31 KG/M2 | HEIGHT: 68 IN | HEART RATE: 68 BPM | WEIGHT: 233 LBS

## 2021-08-26 DIAGNOSIS — R00.2 PALPITATIONS: ICD-10-CM

## 2021-08-26 DIAGNOSIS — I25.9 ISCHEMIC HEART DISEASE: Primary | ICD-10-CM

## 2021-08-26 PROCEDURE — 93000 ELECTROCARDIOGRAM COMPLETE: CPT | Performed by: INTERNAL MEDICINE

## 2021-08-26 PROCEDURE — 99214 OFFICE O/P EST MOD 30 MIN: CPT | Performed by: INTERNAL MEDICINE

## 2021-08-26 RX ORDER — PANTOPRAZOLE SODIUM 40 MG/1
TABLET, DELAYED RELEASE ORAL
COMMUNITY
Start: 2021-06-01

## 2021-08-26 NOTE — PROGRESS NOTES
OFFICE VISIT      Date of Office Visit: 2021    Patient Name: Mellisa Sheldon  : 1940    Encounter Provider: Chintan Sinha MD  Referring Provider: No ref. provider found  Primary Care Provider: Marcial Kwon MD  Place of Service: [facility] CARDIOLOGY        Chief Complaint   Patient presents with   • Coronary Artery Disease   • Follow-up     History of Present Illness    The patient is an 80-year-old white female. She has a history of coronary disease as well as hypertension. She also complains of intermittent palpitations that occur randomly generally lasting about 15 minutes. She states over the last year she has had 4-6 episodes. One time when she looked at her watch the reading was that of atrial fibrillation. She has had monitors in the past we have never been able to capture anything. Her last episode was well over a month ago.    In  the patient experienced an anterior wall myocardial infarction. At that time she had a bare-metal stent placed to the LAD. She had a follow-up intervention in  to the right coronary artery with a drug-eluting stent. Her ejection fraction was borderline low at 45 to 50%. She has no complaints of angina pectoris at this time.    Past Medical History:   Diagnosis Date   • GERD (gastroesophageal reflux disease)    • Hyperlipidemia    • Ischemic cardiomyopathy    • Myocardial infarction (CMS/HCC)          Past Surgical History:   Procedure Laterality Date   • CORONARY ANGIOPLASTY      angioplasties of the LAD and the right coronary artery both with stent placements           Current Outpatient Medications:   •  aspirin 325 MG tablet, Take by mouth daily., Disp: , Rfl:   •  atorvastatin (LIPITOR) 20 MG tablet, Take 1 tablet by mouth Daily., Disp: , Rfl:   •  carvedilol (COREG) 6.25 MG tablet, TAKE ONE TABLET BY MOUTH TWICE A DAY, Disp: 60 tablet, Rfl: 10  •  isosorbide mononitrate (IMDUR) 30 MG 24 hr tablet,  TAKE ONE TABLET BY MOUTH DAILY, Disp: 30 tablet, Rfl: 2  •  Multiple Vitamins-Minerals (PRESERVISION AREDS PO), Take  by mouth., Disp: , Rfl:   •  nitroglycerin (NITROSTAT) 0.4 MG SL tablet, Place under the tongue., Disp: , Rfl:   •  Omega-3 Fatty Acids (FISH OIL) 1000 MG capsule capsule, Take  by mouth Daily With Breakfast., Disp: , Rfl:   •  pantoprazole (PROTONIX) 40 MG EC tablet, TAKE ONE TABLET BY MOUTH DAILY, Disp: , Rfl:   •  Pyridoxine HCl (VITAMIN B-6 PO), Take by mouth daily., Disp: , Rfl:   •  pantoprazole (PROTONIX) 20 MG EC tablet, Take 1 tablet by mouth 2 (two) times a day. WILL NEED TO GET FUTURE REFILLS THROUGH PCP., Disp: 60 tablet, Rfl: 0      Social History     Socioeconomic History   • Marital status:      Spouse name: Not on file   • Number of children: Not on file   • Years of education: Not on file   • Highest education level: Not on file   Tobacco Use   • Smoking status: Former Smoker     Packs/day: 2.00     Years: 40.00     Pack years: 80.00     Types: Cigarettes     Quit date: 10/14/2009     Years since quittin.8   • Smokeless tobacco: Never Used   • Tobacco comment: caffeine use   Substance and Sexual Activity   • Alcohol use: No   • Drug use: No         Review of Systems   Constitutional: Negative.   HENT: Negative.    Eyes: Negative.    Cardiovascular: Positive for palpitations.   Respiratory: Negative.    Endocrine: Negative.    Skin: Negative.    Musculoskeletal: Negative.    Gastrointestinal: Negative.    Neurological: Negative.    Psychiatric/Behavioral: Negative.        Procedures      ECG 12 Lead    Date/Time: 2021 12:46 PM  Performed by: Chintan Sinha MD  Authorized by: Chintan Sinha MD   Comparison: compared with previous ECG from 2020  Similar to previous ECG  Rhythm: sinus rhythm  Rate: normal  Conduction: conduction normal  ST Segments: ST segments normal  T Waves: T waves normal  QRS axis: normal                  Objective:    /84 (BP  "Location: Left arm, Patient Position: Sitting)   Pulse 68   Ht 172.7 cm (68\")   Wt 106 kg (233 lb)   SpO2 98%   BMI 35.43 kg/m²         Vitals reviewed.   Constitutional:       Appearance: Well-developed.   HENT:      Head: Normocephalic.   Neck:      Thyroid: No thyromegaly.      Vascular: No carotid bruit or JVD.   Pulmonary:      Effort: Pulmonary effort is normal.      Breath sounds: Normal breath sounds.   Cardiovascular:      Normal rate. Regular rhythm. Normal S1. Normal S2.      Murmurs: There is no murmur.      No gallop. No click. No rub.   Pulses:     Intact distal pulses.   Edema:     Peripheral edema absent.   Skin:     General: Skin is warm and dry.      Findings: No erythema.   Neurological:      Mental Status: Alert and oriented to person, place, and time.             Assessment:       Diagnosis Plan   1. Ischemic heart disease     2. Palpitations       1. Coronary artery disease: Status post previous anterior wall myocardial infarction with borderline reduced LVEF. No angina pectoris. Intervention to both the LAD and RCA.  2. Palpitations: Uncertain whether or not this is atrial febrile not. She will call me if she has another event. We talked about an internal loop recorder. This may be our next step. Her symptoms are infrequent  3. Hyperlipidemia: On statin therapy as well as fish oil. Most recent lipid panel at target levels.       Plan:   1. Continue the same  2. Inform me of any palpitations or arrhythmia that she may have.    "

## 2021-10-11 ENCOUNTER — HOSPITAL ENCOUNTER (EMERGENCY)
Facility: HOSPITAL | Age: 81
Discharge: HOME | End: 2021-10-11
Attending: EMERGENCY MEDICINE | Admitting: EMERGENCY MEDICINE

## 2021-10-11 ENCOUNTER — TELEPHONE (OUTPATIENT)
Dept: CARDIOLOGY | Facility: CLINIC | Age: 81
End: 2021-10-11

## 2021-10-11 VITALS
SYSTOLIC BLOOD PRESSURE: 138 MMHG | DIASTOLIC BLOOD PRESSURE: 66 MMHG | WEIGHT: 228 LBS | HEIGHT: 68 IN | RESPIRATION RATE: 16 BRPM | TEMPERATURE: 97.7 F | OXYGEN SATURATION: 100 % | BODY MASS INDEX: 34.56 KG/M2 | HEART RATE: 79 BPM

## 2021-10-11 DIAGNOSIS — I48.92 ATRIAL FLUTTER WITH RAPID VENTRICULAR RESPONSE (HCC): Primary | ICD-10-CM

## 2021-10-11 LAB
ALBUMIN SERPL-MCNC: 4.1 G/DL (ref 3.5–5.2)
ALBUMIN/GLOB SERPL: 1.6 G/DL
ALP SERPL-CCNC: 105 U/L (ref 39–117)
ALT SERPL W P-5'-P-CCNC: 17 U/L (ref 1–33)
ANION GAP SERPL CALCULATED.3IONS-SCNC: 9.7 MMOL/L (ref 5–15)
AST SERPL-CCNC: 21 U/L (ref 1–32)
BASOPHILS # BLD AUTO: 0.04 10*3/MM3 (ref 0–0.2)
BASOPHILS NFR BLD AUTO: 0.7 % (ref 0–1.5)
BILIRUB SERPL-MCNC: 0.6 MG/DL (ref 0–1.2)
BUN SERPL-MCNC: 16 MG/DL (ref 8–23)
BUN/CREAT SERPL: 20.3 (ref 7–25)
CALCIUM SPEC-SCNC: 9.3 MG/DL (ref 8.6–10.5)
CHLORIDE SERPL-SCNC: 105 MMOL/L (ref 98–107)
CO2 SERPL-SCNC: 22.3 MMOL/L (ref 22–29)
CREAT SERPL-MCNC: 0.79 MG/DL (ref 0.57–1)
DEPRECATED RDW RBC AUTO: 43.3 FL (ref 37–54)
EOSINOPHIL # BLD AUTO: 0.21 10*3/MM3 (ref 0–0.4)
EOSINOPHIL NFR BLD AUTO: 3.5 % (ref 0.3–6.2)
ERYTHROCYTE [DISTWIDTH] IN BLOOD BY AUTOMATED COUNT: 12.5 % (ref 12.3–15.4)
GFR SERPL CREATININE-BSD FRML MDRD: 70 ML/MIN/1.73
GLOBULIN UR ELPH-MCNC: 2.5 GM/DL
GLUCOSE SERPL-MCNC: 112 MG/DL (ref 65–99)
HCT VFR BLD AUTO: 40.5 % (ref 34–46.6)
HGB BLD-MCNC: 13.6 G/DL (ref 12–15.9)
IMM GRANULOCYTES # BLD AUTO: 0.01 10*3/MM3 (ref 0–0.05)
IMM GRANULOCYTES NFR BLD AUTO: 0.2 % (ref 0–0.5)
LYMPHOCYTES # BLD AUTO: 1.11 10*3/MM3 (ref 0.7–3.1)
LYMPHOCYTES NFR BLD AUTO: 18.6 % (ref 19.6–45.3)
MAGNESIUM SERPL-MCNC: 2 MG/DL (ref 1.6–2.4)
MCH RBC QN AUTO: 32 PG (ref 26.6–33)
MCHC RBC AUTO-ENTMCNC: 33.6 G/DL (ref 31.5–35.7)
MCV RBC AUTO: 95.3 FL (ref 79–97)
MONOCYTES # BLD AUTO: 0.49 10*3/MM3 (ref 0.1–0.9)
MONOCYTES NFR BLD AUTO: 8.2 % (ref 5–12)
NEUTROPHILS NFR BLD AUTO: 4.1 10*3/MM3 (ref 1.7–7)
NEUTROPHILS NFR BLD AUTO: 68.8 % (ref 42.7–76)
NRBC BLD AUTO-RTO: 0 /100 WBC (ref 0–0.2)
NT-PROBNP SERPL-MCNC: 1417 PG/ML (ref 0–1800)
PLATELET # BLD AUTO: 150 10*3/MM3 (ref 140–450)
PMV BLD AUTO: 11.6 FL (ref 6–12)
POTASSIUM SERPL-SCNC: 4.1 MMOL/L (ref 3.5–5.2)
PROT SERPL-MCNC: 6.6 G/DL (ref 6–8.5)
QT INTERVAL: 335 MS
RBC # BLD AUTO: 4.25 10*6/MM3 (ref 3.77–5.28)
SODIUM SERPL-SCNC: 137 MMOL/L (ref 136–145)
T4 FREE SERPL-MCNC: 1.2 NG/DL (ref 0.93–1.7)
TROPONIN T SERPL-MCNC: <0.01 NG/ML (ref 0–0.03)
TSH SERPL DL<=0.05 MIU/L-ACNC: 2.39 UIU/ML (ref 0.27–4.2)
WBC # BLD AUTO: 5.96 10*3/MM3 (ref 3.4–10.8)

## 2021-10-11 PROCEDURE — 84439 ASSAY OF FREE THYROXINE: CPT | Performed by: EMERGENCY MEDICINE

## 2021-10-11 PROCEDURE — 93010 ELECTROCARDIOGRAM REPORT: CPT | Performed by: INTERNAL MEDICINE

## 2021-10-11 PROCEDURE — 99284 EMERGENCY DEPT VISIT MOD MDM: CPT

## 2021-10-11 PROCEDURE — 85025 COMPLETE CBC W/AUTO DIFF WBC: CPT | Performed by: EMERGENCY MEDICINE

## 2021-10-11 PROCEDURE — 83735 ASSAY OF MAGNESIUM: CPT | Performed by: EMERGENCY MEDICINE

## 2021-10-11 PROCEDURE — 84484 ASSAY OF TROPONIN QUANT: CPT | Performed by: EMERGENCY MEDICINE

## 2021-10-11 PROCEDURE — 84443 ASSAY THYROID STIM HORMONE: CPT | Performed by: EMERGENCY MEDICINE

## 2021-10-11 PROCEDURE — 80053 COMPREHEN METABOLIC PANEL: CPT | Performed by: EMERGENCY MEDICINE

## 2021-10-11 PROCEDURE — 99283 EMERGENCY DEPT VISIT LOW MDM: CPT

## 2021-10-11 PROCEDURE — 83880 ASSAY OF NATRIURETIC PEPTIDE: CPT | Performed by: EMERGENCY MEDICINE

## 2021-10-11 PROCEDURE — 93005 ELECTROCARDIOGRAM TRACING: CPT | Performed by: EMERGENCY MEDICINE

## 2021-10-11 RX ORDER — CARVEDILOL 12.5 MG/1
12.5 TABLET ORAL ONCE
Status: COMPLETED | OUTPATIENT
Start: 2021-10-11 | End: 2021-10-11

## 2021-10-11 RX ORDER — CARVEDILOL 12.5 MG/1
12.5 TABLET ORAL 2 TIMES DAILY
Qty: 60 TABLET | Refills: 1 | Status: SHIPPED | OUTPATIENT
Start: 2021-10-11 | End: 2021-11-01 | Stop reason: SDUPTHER

## 2021-10-11 RX ORDER — SODIUM CHLORIDE 0.9 % (FLUSH) 0.9 %
10 SYRINGE (ML) INJECTION AS NEEDED
Status: DISCONTINUED | OUTPATIENT
Start: 2021-10-11 | End: 2021-10-11 | Stop reason: HOSPADM

## 2021-10-11 RX ADMIN — CARVEDILOL 12.5 MG: 12.5 TABLET, FILM COATED ORAL at 12:22

## 2021-10-11 NOTE — ED NOTES
This tech wore appropriate PPE during patient encounter. Hand hygiene was performed before and after each patient encounter.     Louisa Sierra, PCT  10/11/21 1230

## 2021-10-11 NOTE — ED TRIAGE NOTES
"Pt reports she has a rapid HR and sometimes it's \"irregular.\"  This has hap[pened before and she is never able to come in while it's happening.  Her cardiologist is aware.  Hx stent placement 2011     Patient was placed in face mask during first look triage.  Patient was wearing a face mask throughout encounter.  I wore personal protective equipment throughout the encounter.  Hand hygiene was performed before and after patient encounter.     "

## 2021-10-11 NOTE — TELEPHONE ENCOUNTER
Ms. Sheldon called and left a voice mail on Susan's phone stating she was having Rapid HR.      I called her back and got a message stating to enter remote acces code.      I could not leave a message and did not speak with Ms. Sheldon.

## 2021-10-11 NOTE — TELEPHONE ENCOUNTER
I called a different number [phone] and got Ms. Sheldon.  She was already on her way to the hospital.      I told her I would let Dr. Sinha know.    Dr. Ernestine LARSEN.

## 2021-10-11 NOTE — ED PROVIDER NOTES
EMERGENCY DEPARTMENT ENCOUNTER    Room Number:    Date seen:  10/11/2021  PCP: Marcial Kwon MD  Historian: Patient      HPI:  Chief Complaint: Rapid heart rate  A complete HPI/ROS/PMH/PSH/SH/FH are unobtainable due to: Nothing  Context: Mellisa Sheldon is a 80 y.o. female who presents to the ED c/o palpitations began last night.  These continue this morning.  She also noted that her apple watch was detecting a rapid heart rate and telling her that she was in atrial fibrillation.  She has had some similar symptoms in the past but her cardiologist has never been able to capture exactly what she was experiencing.  They had considered a loop recorder placement at one time but this was deferred.  She denies previous diagnosis of atrial fibrillation.  She denies dizziness, lightheadedness, chest pain, shortness of air.  No history of thyroid issues.  She denies lower extremity swelling.  She does have a history of CAD status post PCI x3.            PAST MEDICAL HISTORY  Active Ambulatory Problems     Diagnosis Date Noted   • Ischemic heart disease 2017     Resolved Ambulatory Problems     Diagnosis Date Noted   • No Resolved Ambulatory Problems     Past Medical History:   Diagnosis Date   • GERD (gastroesophageal reflux disease)    • Hyperlipidemia    • Ischemic cardiomyopathy    • Myocardial infarction (HCC)          PAST SURGICAL HISTORY  Past Surgical History:   Procedure Laterality Date   • CORONARY ANGIOPLASTY      angioplasties of the LAD and the right coronary artery both with stent placements         FAMILY HISTORY  History reviewed. No pertinent family history.      SOCIAL HISTORY  Social History     Socioeconomic History   • Marital status:    Tobacco Use   • Smoking status: Former Smoker     Packs/day: 2.00     Years: 40.00     Pack years: 80.00     Types: Cigarettes     Quit date: 10/14/2009     Years since quittin.0   • Smokeless tobacco: Never Used   • Tobacco comment:  caffeine use   Substance and Sexual Activity   • Alcohol use: No   • Drug use: No         ALLERGIES  Celecoxib, Ibuprofen, Niacin, and Nsaids        REVIEW OF SYSTEMS  Review of Systems   Review of all 14 systems is negative other than stated in the HPI above.      PHYSICAL EXAM  ED Triage Vitals   Temp Heart Rate Resp BP SpO2   10/11/21 0955 10/11/21 0955 10/11/21 0955 10/11/21 1017 10/11/21 0955   97.7 °F (36.5 °C) (!) 133 16 (!) 156/136 98 %      Temp src Heart Rate Source Patient Position BP Location FiO2 (%)   10/11/21 0955 10/11/21 0955 10/11/21 1021 10/11/21 1021 --   Tympanic Monitor Sitting Right arm          GENERAL: Awake and alert, no acute distress  HENT: nares patent  EYES: no scleral icterus  CV: Irregular rhythm, tachycardic  RESPIRATORY: normal effort, lungs clear to auscultation bilaterally  ABDOMEN: soft, nondistended, nontender throughout  MUSCULOSKELETAL: no deformity, no peripheral edema  NEURO: alert, moves all extremities, follows commands  PSYCH:  calm, cooperative  SKIN: warm, dry    Vital signs and nursing notes reviewed.          LAB RESULTS  Recent Results (from the past 24 hour(s))   ECG 12 Lead    Collection Time: 10/11/21 11:07 AM   Result Value Ref Range    QT Interval 335 ms   Comprehensive Metabolic Panel    Collection Time: 10/11/21 11:24 AM    Specimen: Blood   Result Value Ref Range    Glucose 112 (H) 65 - 99 mg/dL    BUN 16 8 - 23 mg/dL    Creatinine 0.79 0.57 - 1.00 mg/dL    Sodium 137 136 - 145 mmol/L    Potassium 4.1 3.5 - 5.2 mmol/L    Chloride 105 98 - 107 mmol/L    CO2 22.3 22.0 - 29.0 mmol/L    Calcium 9.3 8.6 - 10.5 mg/dL    Total Protein 6.6 6.0 - 8.5 g/dL    Albumin 4.10 3.50 - 5.20 g/dL    ALT (SGPT) 17 1 - 33 U/L    AST (SGOT) 21 1 - 32 U/L    Alkaline Phosphatase 105 39 - 117 U/L    Total Bilirubin 0.6 0.0 - 1.2 mg/dL    eGFR Non African Amer 70 >60 mL/min/1.73    Globulin 2.5 gm/dL    A/G Ratio 1.6 g/dL    BUN/Creatinine Ratio 20.3 7.0 - 25.0    Anion Gap 9.7 5.0  - 15.0 mmol/L   BNP    Collection Time: 10/11/21 11:24 AM    Specimen: Blood   Result Value Ref Range    proBNP 1,417.0 0.0-1,800.0 pg/mL   Troponin    Collection Time: 10/11/21 11:24 AM    Specimen: Blood   Result Value Ref Range    Troponin T <0.010 0.000 - 0.030 ng/mL   TSH    Collection Time: 10/11/21 11:24 AM    Specimen: Blood   Result Value Ref Range    TSH 2.390 0.270 - 4.200 uIU/mL   T4, Free    Collection Time: 10/11/21 11:24 AM    Specimen: Blood   Result Value Ref Range    Free T4 1.20 0.93 - 1.70 ng/dL   Magnesium    Collection Time: 10/11/21 11:24 AM    Specimen: Blood   Result Value Ref Range    Magnesium 2.0 1.6 - 2.4 mg/dL   CBC Auto Differential    Collection Time: 10/11/21 11:24 AM    Specimen: Blood   Result Value Ref Range    WBC 5.96 3.40 - 10.80 10*3/mm3    RBC 4.25 3.77 - 5.28 10*6/mm3    Hemoglobin 13.6 12.0 - 15.9 g/dL    Hematocrit 40.5 34.0 - 46.6 %    MCV 95.3 79.0 - 97.0 fL    MCH 32.0 26.6 - 33.0 pg    MCHC 33.6 31.5 - 35.7 g/dL    RDW 12.5 12.3 - 15.4 %    RDW-SD 43.3 37.0 - 54.0 fl    MPV 11.6 6.0 - 12.0 fL    Platelets 150 140 - 450 10*3/mm3    Neutrophil % 68.8 42.7 - 76.0 %    Lymphocyte % 18.6 (L) 19.6 - 45.3 %    Monocyte % 8.2 5.0 - 12.0 %    Eosinophil % 3.5 0.3 - 6.2 %    Basophil % 0.7 0.0 - 1.5 %    Immature Grans % 0.2 0.0 - 0.5 %    Neutrophils, Absolute 4.10 1.70 - 7.00 10*3/mm3    Lymphocytes, Absolute 1.11 0.70 - 3.10 10*3/mm3    Monocytes, Absolute 0.49 0.10 - 0.90 10*3/mm3    Eosinophils, Absolute 0.21 0.00 - 0.40 10*3/mm3    Basophils, Absolute 0.04 0.00 - 0.20 10*3/mm3    Immature Grans, Absolute 0.01 0.00 - 0.05 10*3/mm3    nRBC 0.0 0.0 - 0.2 /100 WBC       Ordered the above labs and reviewed the results.        RADIOLOGY  No Radiology Exams Resulted Within Past 24 Hours    Ordered the above noted radiological studies. Reviewed by me in PACS.            PROCEDURES  Procedures              MEDICATIONS GIVEN IN ER  Medications   carvedilol (COREG) tablet 12.5 mg  (12.5 mg Oral Given 10/11/21 1222)                   MEDICAL DECISION MAKING, PROGRESS, and CONSULTS    All labs have been independently reviewed by me.  All radiology studies have been reviewed by me and discussed with radiologist dictating the report.   EKG's independently viewed and interpreted by me.  Discussion below represents my analysis of pertinent findings related to patient's condition, differential diagnosis, treatment plan and final disposition.      Differential diagnosis includes but is not limited to:  Atrial flutter  SVT  Atrial fibrillation  Sinus tachycardia    ED Course as of 10/11/21 1557   Mon Oct 11, 2021   1150 Discussed with Dr. Sinha, patient's cardiologist, who reviewed her EKG and believes it looks consistent with atrial flutter/atrial fibrillation.  He requested initiation of Eliquis and also increase of her home Coreg from 6.25 mg twice daily to 12.5 mg p.o. twice daily.  He would like her to follow-up with him in the office. [JR]   1152 EKG          EKG time: 1107  Rhythm/Rate: Atrial flutter, rate 128  P waves and IN: N/A  QRS, axis: Borderline left axis  ST and T waves: No acute ischemic changes    Interpreted Contemporaneously by me, independently viewed         [JR]   1202 Free T4: 1.20 [JR]   1202 Troponin T: <0.010 [JR]   1202 Magnesium: 2.0 [JR]   1202 WBC: 5.96 [JR]   1221 Patient updated on plan to begin Eliquis for stroke prophylaxis.  Discussed the risk and benefits of anticoagulation therapy.  I also informed her of the plan to increase her Coreg dose to 12.5 twice daily and follow-up with her cardiologist in the office.  Return precautions were discussed. [JR]      ED Course User Index  [JR] Varun Morris MD       Patient spontaneously converted from what appeared to be atrial flutter or atrial fibrillation on cardiac monitor and atrial flutter on EKG to now sinus rhythm with rate in the 70s.  She is now asymptomatic.  Labs reviewed and reassuring today  including normal thyroid function studies, normal electrolytes, negative cardiac troponin.       I wore an N95 mask, face shield, and gloves during this patient encounter.  Patient also wearing a surgical mask.  Hand hygeine performed before and after seeing the patient.    DIAGNOSIS  Final diagnoses:   Atrial flutter with rapid ventricular response (HCC)         DISPOSITION    DISCHARGE    Patient discharged in stable condition.    Reviewed implications of results, diagnosis, meds, responsibility to follow up, warning signs and symptoms of possible worsening, potential complications and reasons to return to ER.    Patient/Family voiced understanding of above instructions.    Discussed plan for discharge, as there is no emergent indication for admission. Patient referred to primary care provider for BP management due to today's BP. Pt/family is agreeable and understands need for follow up and repeat testing.  Pt is aware that discharge does not mean that nothing is wrong but it indicates no emergency is present that requires admission and they must continue care with follow-up as given below or physician of their choice.     FOLLOW-UP  Chintan Sinha MD  [address] [address]  [phone]    Schedule an appointment as soon as possible for a visit            Medication List      New Prescriptions    apixaban 5 MG tablet tablet  Commonly known as: ELIQUIS  Take 1 tablet by mouth Every 12 (Twelve) Hours for 30 days.        Changed    carvedilol 12.5 MG tablet  Commonly known as: COREG  Take 1 tablet by mouth 2 (Two) Times a Day.  What changed:   · medication strength  · how much to take           Where to Get Your Medications      These medications were sent to [facility] [facility] [phone] [facility] [phone]   [address] [place] [address]    Phone: [phone]   · apixaban 5 MG tablet  tablet  · carvedilol 12.5 MG tablet                   Latest Documented Vital Signs:  As of 15:57 EDT  BP- 138/66 HR- 79 Temp- 97.7 °F (36.5 °C) (Tympanic) O2 sat- 100%        --    Please note that portions of this were completed with a voice recognition program.            Varun Morris MD  10/11/21 5100

## 2021-10-28 NOTE — TELEPHONE ENCOUNTER
Please advise filling Isosorbide    LOV   -   8/26/21  Next   -   11/1/21  Last labs   -   10/11/21    Nadege PAPPAS

## 2021-10-29 RX ORDER — ISOSORBIDE MONONITRATE 30 MG/1
30 TABLET, EXTENDED RELEASE ORAL DAILY
Qty: 90 TABLET | Refills: 3 | Status: SHIPPED | OUTPATIENT
Start: 2021-10-29 | End: 2022-08-22

## 2021-11-01 ENCOUNTER — OFFICE VISIT (OUTPATIENT)
Dept: CARDIOLOGY | Facility: CLINIC | Age: 81
End: 2021-11-01

## 2021-11-01 VITALS
HEIGHT: 68 IN | WEIGHT: 229.8 LBS | HEART RATE: 63 BPM | DIASTOLIC BLOOD PRESSURE: 76 MMHG | BODY MASS INDEX: 34.83 KG/M2 | SYSTOLIC BLOOD PRESSURE: 152 MMHG | OXYGEN SATURATION: 97 %

## 2021-11-01 DIAGNOSIS — I48.4 ATYPICAL ATRIAL FLUTTER (HCC): ICD-10-CM

## 2021-11-01 DIAGNOSIS — I25.9 ISCHEMIC HEART DISEASE: ICD-10-CM

## 2021-11-01 DIAGNOSIS — E78.5 HYPERLIPIDEMIA LDL GOAL <70: ICD-10-CM

## 2021-11-01 DIAGNOSIS — I10 ESSENTIAL HYPERTENSION: ICD-10-CM

## 2021-11-01 DIAGNOSIS — I48.92 ATRIAL FLUTTER WITH RAPID VENTRICULAR RESPONSE (HCC): Primary | ICD-10-CM

## 2021-11-01 PROCEDURE — 99214 OFFICE O/P EST MOD 30 MIN: CPT | Performed by: INTERNAL MEDICINE

## 2021-11-01 PROCEDURE — 93000 ELECTROCARDIOGRAM COMPLETE: CPT | Performed by: INTERNAL MEDICINE

## 2021-11-01 RX ORDER — CARVEDILOL 25 MG/1
25 TABLET ORAL 2 TIMES DAILY
Qty: 180 TABLET | Refills: 1 | Status: SHIPPED | OUTPATIENT
Start: 2021-11-01 | End: 2021-12-28

## 2021-11-01 NOTE — PROGRESS NOTES
OFFICE VISIT      Date of Office Visit: 2021    Patient Name: Mellisa Sheldon  : 1940    Encounter Provider: Chintan Sinha MD  Referring Provider: No ref. provider found  Primary Care Provider: Marcial Kwon MD  Place of Service: [facility] CARDIOLOGY        Chief Complaint   Patient presents with   • Ischemic heart disease   • Irregular Heart Beat     History of Present Illness    The patient is an 80-year-old white female with coronary artery disease and newly diagnosed atrial flutter that was captured on an emergency room visit in October.  At the time of her visit she converted spontaneously to normal sinus rhythm.  She was placed on anticoagulation with apixaban.  She has had only one other episode since that time.    The patient has coronary artery disease.  She has had a previous anterior wall myocardial infarction.  She has had intervention to both the LAD and RCA.  Left ventricular systolic function is minimally reduced.  No angina pectoris at this time.    Past Medical History:   Diagnosis Date   • GERD (gastroesophageal reflux disease)    • Hyperlipidemia    • Ischemic cardiomyopathy    • Myocardial infarction (HCC)          Past Surgical History:   Procedure Laterality Date   • CORONARY ANGIOPLASTY      angioplasties of the LAD and the right coronary artery both with stent placements           Current Outpatient Medications:   •  apixaban (ELIQUIS) 5 MG tablet tablet, Take 1 tablet by mouth Every 12 (Twelve) Hours for 30 days., Disp: 180 tablet, Rfl: 1  •  atorvastatin (LIPITOR) 20 MG tablet, Take 1 tablet by mouth Daily., Disp: , Rfl:   •  carvedilol (COREG) 25 MG tablet, Take 1 tablet by mouth 2 (Two) Times a Day., Disp: 180 tablet, Rfl: 1  •  isosorbide mononitrate (IMDUR) 30 MG 24 hr tablet, Take 1 tablet by mouth Daily., Disp: 90 tablet, Rfl: 3  •  Multiple Vitamins-Minerals (PRESERVISION AREDS PO), Take  by mouth., Disp: , Rfl:   •   "nitroglycerin (NITROSTAT) 0.4 MG SL tablet, Place under the tongue., Disp: , Rfl:   •  Omega-3 Fatty Acids (FISH OIL) 1000 MG capsule capsule, Take  by mouth Daily With Breakfast., Disp: , Rfl:   •  pantoprazole (PROTONIX) 20 MG EC tablet, Take 1 tablet by mouth 2 (two) times a day. WILL NEED TO GET FUTURE REFILLS THROUGH PCP., Disp: 60 tablet, Rfl: 0  •  pantoprazole (PROTONIX) 40 MG EC tablet, TAKE ONE TABLET BY MOUTH DAILY, Disp: , Rfl:   •  Pyridoxine HCl (VITAMIN B-6 PO), Take by mouth daily., Disp: , Rfl:       Social History     Socioeconomic History   • Marital status:    Tobacco Use   • Smoking status: Former Smoker     Packs/day: 2.00     Years: 40.00     Pack years: 80.00     Types: Cigarettes     Quit date: 10/14/2009     Years since quittin.0   • Smokeless tobacco: Never Used   • Tobacco comment: caffeine use   Substance and Sexual Activity   • Alcohol use: No   • Drug use: No         Review of Systems   Constitutional: Positive for malaise/fatigue.   Cardiovascular: Positive for dyspnea on exertion and palpitations.       Procedures      ECG 12 Lead    Date/Time: 2021 3:39 PM  Performed by: Chintan Sinha MD  Authorized by: hCintan Sinha MD   Comparison: compared with previous ECG from 10/11/2021  Comparison to previous ECG: Atrial flutter resolved  Rhythm: sinus rhythm  Rate: normal  ST Segments: ST segments normal  QRS axis: normal                  Objective:    /76 (BP Location: Right arm, Patient Position: Sitting)   Pulse 63   Ht 172.7 cm (68\")   Wt 104 kg (229 lb 12.8 oz)   SpO2 97%   BMI 34.94 kg/m²         Vitals reviewed.   Constitutional:       Appearance: Healthy appearance. Well-developed and not in distress.   Eyes:      Pupils: Pupils are equal, round, and reactive to light.   HENT:      Head: Normocephalic.   Neck:      Thyroid: No thyromegaly.      Vascular: No carotid bruit or JVD.   Pulmonary:      Effort: Pulmonary effort is normal.      " Breath sounds: Normal breath sounds.   Cardiovascular:      Normal rate. Regular rhythm. Normal S1. Normal S2.      Murmurs: There is no murmur.      No gallop. No click. No rub.   Pulses:     Intact distal pulses.   Edema:     Peripheral edema absent.   Abdominal:      General: Bowel sounds are normal.      Palpations: Abdomen is soft.   Musculoskeletal:      Cervical back: Normal range of motion. Skin:     General: Skin is warm and dry.      Findings: No erythema.   Neurological:      Mental Status: Alert and oriented to person, place, and time.             Assessment:       Diagnosis Plan   1. Atrial flutter with rapid ventricular response (HCC)  Adult Transthoracic Echo Complete w/ Color, Spectral and Contrast if Necessary Per Protocol   2. Atypical atrial flutter (HCC)   Adult Transthoracic Echo Complete w/ Color, Spectral and Contrast if Necessary Per Protocol   3. Ischemic heart disease         1.  Atrial flutter: I discussed in detail with the patient and her daughter the plan for management.  We will increase her carvedilol to 25 mg twice daily in part due to hypertension that is not adequately controlled.  I'm also going to have her continue her apixaban and reviewed potential complications on the drug.  In addition we will obtain an echocardiogram and advise next steps  2.  Coronary artery disease: Status post previous myocardial infarction with intervention to LAD and RCA.  No angina pectoris  3.  Hypertension: Inadequately controlled     Plan:

## 2021-11-12 ENCOUNTER — HOSPITAL ENCOUNTER (OUTPATIENT)
Dept: CARDIOLOGY | Facility: HOSPITAL | Age: 81
Discharge: HOME | End: 2021-11-12
Admitting: INTERNAL MEDICINE

## 2021-11-12 VITALS
BODY MASS INDEX: 34.71 KG/M2 | SYSTOLIC BLOOD PRESSURE: 152 MMHG | WEIGHT: 229 LBS | DIASTOLIC BLOOD PRESSURE: 76 MMHG | HEIGHT: 68 IN

## 2021-11-12 DIAGNOSIS — I48.92 ATRIAL FLUTTER WITH RAPID VENTRICULAR RESPONSE (HCC): ICD-10-CM

## 2021-11-12 DIAGNOSIS — I48.4 ATYPICAL ATRIAL FLUTTER (HCC): ICD-10-CM

## 2021-11-12 PROCEDURE — 25010000002 PERFLUTREN (DEFINITY) 8.476 MG IN SODIUM CHLORIDE (PF) 0.9 % 10 ML INJECTION: Performed by: INTERNAL MEDICINE

## 2021-11-12 PROCEDURE — 93306 TTE W/DOPPLER COMPLETE: CPT | Performed by: INTERNAL MEDICINE

## 2021-11-12 PROCEDURE — 93306 TTE W/DOPPLER COMPLETE: CPT

## 2021-11-12 RX ADMIN — PERFLUTREN 1.5 ML: 6.52 INJECTION, SUSPENSION INTRAVENOUS at 15:55

## 2021-11-15 ENCOUNTER — TELEPHONE (OUTPATIENT)
Dept: CARDIOLOGY | Facility: CLINIC | Age: 81
End: 2021-11-15

## 2021-11-15 LAB
AORTIC ARCH: 2.1 CM
ASCENDING AORTA: 3.3 CM
BH CV ECHO MEAS - ACS: 1.9 CM
BH CV ECHO MEAS - AO MAX PG (FULL): 3.1 MMHG
BH CV ECHO MEAS - AO MAX PG: 5.1 MMHG
BH CV ECHO MEAS - AO MEAN PG (FULL): 1.3 MMHG
BH CV ECHO MEAS - AO MEAN PG: 2.3 MMHG
BH CV ECHO MEAS - AO ROOT AREA (BSA CORRECTED): 1.4
BH CV ECHO MEAS - AO ROOT AREA: 7.2 CM^2
BH CV ECHO MEAS - AO ROOT DIAM: 3 CM
BH CV ECHO MEAS - AO V2 MAX: 113.2 CM/SEC
BH CV ECHO MEAS - AO V2 MEAN: 68.6 CM/SEC
BH CV ECHO MEAS - AO V2 VTI: 22.6 CM
BH CV ECHO MEAS - ASC AORTA: 3.3 CM
BH CV ECHO MEAS - AVA(I,A): 1.9 CM^2
BH CV ECHO MEAS - AVA(I,D): 1.9 CM^2
BH CV ECHO MEAS - AVA(V,A): 1.9 CM^2
BH CV ECHO MEAS - AVA(V,D): 1.9 CM^2
BH CV ECHO MEAS - BSA(HAYCOCK): 2.3 M^2
BH CV ECHO MEAS - BSA: 2.2 M^2
BH CV ECHO MEAS - BZI_BMI: 34.8 KILOGRAMS/M^2
BH CV ECHO MEAS - BZI_METRIC_HEIGHT: 172.7 CM
BH CV ECHO MEAS - BZI_METRIC_WEIGHT: 103.9 KG
BH CV ECHO MEAS - EDV(MOD-SP2): 95 ML
BH CV ECHO MEAS - EDV(MOD-SP4): 104 ML
BH CV ECHO MEAS - EDV(TEICH): 90 ML
BH CV ECHO MEAS - EF(CUBED): 85.6 %
BH CV ECHO MEAS - EF(MOD-BP): 64 %
BH CV ECHO MEAS - EF(MOD-SP2): 57.9 %
BH CV ECHO MEAS - EF(MOD-SP4): 69.2 %
BH CV ECHO MEAS - EF(TEICH): 79.1 %
BH CV ECHO MEAS - ESV(MOD-SP2): 40 ML
BH CV ECHO MEAS - ESV(MOD-SP4): 32 ML
BH CV ECHO MEAS - ESV(TEICH): 18.8 ML
BH CV ECHO MEAS - FS: 47.5 %
BH CV ECHO MEAS - IVS/LVPW: 1
BH CV ECHO MEAS - IVSD: 1.2 CM
BH CV ECHO MEAS - LAT PEAK E' VEL: 8.7 CM/SEC
BH CV ECHO MEAS - LV DIASTOLIC VOL/BSA (35-75): 48 ML/M^2
BH CV ECHO MEAS - LV MASS(C)D: 189.2 GRAMS
BH CV ECHO MEAS - LV MASS(C)DI: 87.4 GRAMS/M^2
BH CV ECHO MEAS - LV MAX PG: 2 MMHG
BH CV ECHO MEAS - LV MEAN PG: 1 MMHG
BH CV ECHO MEAS - LV SYSTOLIC VOL/BSA (12-30): 14.8 ML/M^2
BH CV ECHO MEAS - LV V1 MAX: 70.7 CM/SEC
BH CV ECHO MEAS - LV V1 MEAN: 46.1 CM/SEC
BH CV ECHO MEAS - LV V1 VTI: 14.1 CM
BH CV ECHO MEAS - LVIDD: 4.4 CM
BH CV ECHO MEAS - LVIDS: 2.3 CM
BH CV ECHO MEAS - LVLD AP2: 7.1 CM
BH CV ECHO MEAS - LVLD AP4: 7.6 CM
BH CV ECHO MEAS - LVLS AP2: 6.1 CM
BH CV ECHO MEAS - LVLS AP4: 6.7 CM
BH CV ECHO MEAS - LVOT AREA (M): 3.1 CM^2
BH CV ECHO MEAS - LVOT AREA: 3 CM^2
BH CV ECHO MEAS - LVOT DIAM: 2 CM
BH CV ECHO MEAS - LVPWD: 1.2 CM
BH CV ECHO MEAS - MED PEAK E' VEL: 10.2 CM/SEC
BH CV ECHO MEAS - MV A DUR: 0.08 SEC
BH CV ECHO MEAS - MV A MAX VEL: 75.4 CM/SEC
BH CV ECHO MEAS - MV DEC SLOPE: 242.4 CM/SEC^2
BH CV ECHO MEAS - MV DEC TIME: 0.2 SEC
BH CV ECHO MEAS - MV E MAX VEL: 57 CM/SEC
BH CV ECHO MEAS - MV E/A: 0.76
BH CV ECHO MEAS - MV MAX PG: 2.8 MMHG
BH CV ECHO MEAS - MV MEAN PG: 1.3 MMHG
BH CV ECHO MEAS - MV P1/2T MAX VEL: 57 CM/SEC
BH CV ECHO MEAS - MV P1/2T: 68.9 MSEC
BH CV ECHO MEAS - MV V2 MAX: 83.8 CM/SEC
BH CV ECHO MEAS - MV V2 MEAN: 54.8 CM/SEC
BH CV ECHO MEAS - MV V2 VTI: 27.3 CM
BH CV ECHO MEAS - MVA P1/2T LCG: 3.9 CM^2
BH CV ECHO MEAS - MVA(P1/2T): 3.2 CM^2
BH CV ECHO MEAS - MVA(VTI): 1.5 CM^2
BH CV ECHO MEAS - PA MAX PG (FULL): 1.2 MMHG
BH CV ECHO MEAS - PA MAX PG: 3.3 MMHG
BH CV ECHO MEAS - PA V2 MAX: 90.9 CM/SEC
BH CV ECHO MEAS - PULM A REVS DUR: 0.11 SEC
BH CV ECHO MEAS - PULM A REVS VEL: 26.6 CM/SEC
BH CV ECHO MEAS - PULM DIAS VEL: 22.7 CM/SEC
BH CV ECHO MEAS - PULM S/D: 1.3
BH CV ECHO MEAS - PULM SYS VEL: 28.7 CM/SEC
BH CV ECHO MEAS - PVA(V,A): 3.2 CM^2
BH CV ECHO MEAS - PVA(V,D): 3.2 CM^2
BH CV ECHO MEAS - QP/QS: 1.3
BH CV ECHO MEAS - RV MAX PG: 2.1 MMHG
BH CV ECHO MEAS - RV MEAN PG: 1.1 MMHG
BH CV ECHO MEAS - RV V1 MAX: 72.8 CM/SEC
BH CV ECHO MEAS - RV V1 MEAN: 48.6 CM/SEC
BH CV ECHO MEAS - RV V1 VTI: 13.9 CM
BH CV ECHO MEAS - RVOT AREA: 4 CM^2
BH CV ECHO MEAS - RVOT DIAM: 2.2 CM
BH CV ECHO MEAS - SI(AO): 74.9 ML/M^2
BH CV ECHO MEAS - SI(CUBED): 34.8 ML/M^2
BH CV ECHO MEAS - SI(LVOT): 19.5 ML/M^2
BH CV ECHO MEAS - SI(MOD-SP2): 25.4 ML/M^2
BH CV ECHO MEAS - SI(MOD-SP4): 33.3 ML/M^2
BH CV ECHO MEAS - SI(TEICH): 32.9 ML/M^2
BH CV ECHO MEAS - SUP REN AO DIAM: 2 CM
BH CV ECHO MEAS - SV(AO): 162.2 ML
BH CV ECHO MEAS - SV(CUBED): 75.4 ML
BH CV ECHO MEAS - SV(LVOT): 42.2 ML
BH CV ECHO MEAS - SV(MOD-SP2): 55 ML
BH CV ECHO MEAS - SV(MOD-SP4): 72 ML
BH CV ECHO MEAS - SV(RVOT): 55 ML
BH CV ECHO MEAS - SV(TEICH): 71.2 ML
BH CV ECHO MEAS - TAPSE (>1.6): 2.8 CM
BH CV ECHO MEASUREMENTS AVERAGE E/E' RATIO: 6.03
BH CV XLRA - RV BASE: 3.3 CM
BH CV XLRA - RV LENGTH: 5.2 CM
BH CV XLRA - RV MID: 2.6 CM
BH CV XLRA - TDI S': 11.9 CM/SEC
LEFT ATRIUM VOLUME INDEX: 15 ML/M2
MAXIMAL PREDICTED HEART RATE: 139 BPM
SINUS: 3.3 CM
STJ: 2.4 CM
STRESS TARGET HR: 118 BPM

## 2021-11-15 NOTE — TELEPHONE ENCOUNTER
Ms. Sheldon needs a follow up appointment.  Can you please call and get her scheduled?    Thanks,    Nadege

## 2021-11-16 NOTE — TELEPHONE ENCOUNTER
Do you want Ms. Sheldon to have a three month appointment?  She called and stated she did.  I didn't see anything in the note.     Please advise

## 2021-12-28 ENCOUNTER — OFFICE VISIT (OUTPATIENT)
Dept: CARDIOLOGY | Facility: CLINIC | Age: 81
End: 2021-12-28

## 2021-12-28 ENCOUNTER — TELEPHONE (OUTPATIENT)
Dept: CARDIOLOGY | Facility: CLINIC | Age: 81
End: 2021-12-28

## 2021-12-28 VITALS
DIASTOLIC BLOOD PRESSURE: 82 MMHG | BODY MASS INDEX: 36.1 KG/M2 | OXYGEN SATURATION: 96 % | HEART RATE: 129 BPM | WEIGHT: 238.2 LBS | SYSTOLIC BLOOD PRESSURE: 128 MMHG | HEIGHT: 68 IN

## 2021-12-28 DIAGNOSIS — I10 ESSENTIAL HYPERTENSION: ICD-10-CM

## 2021-12-28 DIAGNOSIS — I50.31 ACUTE DIASTOLIC CONGESTIVE HEART FAILURE (HCC): ICD-10-CM

## 2021-12-28 DIAGNOSIS — R06.09 DYSPNEA ON EXERTION: ICD-10-CM

## 2021-12-28 DIAGNOSIS — I48.92 ATRIAL FLUTTER WITH RAPID VENTRICULAR RESPONSE (HCC): Primary | ICD-10-CM

## 2021-12-28 DIAGNOSIS — E78.5 HYPERLIPIDEMIA LDL GOAL <70: ICD-10-CM

## 2021-12-28 DIAGNOSIS — I25.9 ISCHEMIC HEART DISEASE: ICD-10-CM

## 2021-12-28 PROCEDURE — 99214 OFFICE O/P EST MOD 30 MIN: CPT | Performed by: NURSE PRACTITIONER

## 2021-12-28 PROCEDURE — 93000 ELECTROCARDIOGRAM COMPLETE: CPT | Performed by: NURSE PRACTITIONER

## 2021-12-28 RX ORDER — DILTIAZEM HYDROCHLORIDE 240 MG/1
240 CAPSULE, COATED, EXTENDED RELEASE ORAL DAILY
Qty: 30 CAPSULE | Refills: 2 | Status: SHIPPED | OUTPATIENT
Start: 2021-12-28 | End: 2022-03-29 | Stop reason: SDUPTHER

## 2021-12-28 RX ORDER — METOPROLOL TARTRATE 50 MG/1
50 TABLET, FILM COATED ORAL 2 TIMES DAILY
Qty: 180 TABLET | Refills: 3 | Status: SHIPPED | OUTPATIENT
Start: 2021-12-28 | End: 2022-02-28

## 2021-12-28 RX ORDER — POTASSIUM CHLORIDE 20 MEQ/1
20 TABLET, EXTENDED RELEASE ORAL DAILY
Qty: 3 TABLET | Refills: 0 | Status: SHIPPED | OUTPATIENT
Start: 2021-12-30 | End: 2022-01-02

## 2021-12-28 RX ORDER — PANTOPRAZOLE SODIUM 40 MG/1
1 TABLET, DELAYED RELEASE ORAL DAILY
COMMUNITY
Start: 2021-11-30 | End: 2022-01-04 | Stop reason: SDUPTHER

## 2021-12-28 RX ORDER — FUROSEMIDE 40 MG/1
40 TABLET ORAL DAILY
Qty: 3 TABLET | Refills: 0 | Status: SHIPPED | OUTPATIENT
Start: 2021-12-30 | End: 2022-01-04

## 2021-12-28 NOTE — TELEPHONE ENCOUNTER
Pt is reporting that she has been in and out of afib since hanny.  She thinks she over did it.  She said her heart rate has been as high as 120.  Her biggest complaint is her SOA.  She explains she can't walk across the house with out having to rest and having worsening SOA, with Wheezing.  She denies a temp.  She said the SOA is worse when she is flat.    I have scheduled her for further evaluation today with Ebony Lemos NP  Thanks  Jacey Heard RN  Triage nurse

## 2021-12-28 NOTE — PROGRESS NOTES
"         OFFICE VISIT      Date of Office Visit: 2021    Patient Name: Mellisa Sheldon  : 1940    Encounter Provider: MIKE Chen  Referring Provider: No ref. provider found  Primary Care Provider: Marcial Kwon MD  Place of Service: [facility] CARDIOLOGY        Chief Complaint   Patient presents with   • Atrial Fibrillation   • Shortness of Breath     History of Present Illness  Ms. Sheldon is an active 81-year-old female patient of Dr. Sinha.  She is new to me today and presents with complaints of \"going in and out of A. Fib\", fatigue and shortness of breath with exertion over the last several days.  She states that her heart rate has gotten up to the 120s.  She was last seen in the office by Dr. Sinha in the beginning of November.  Her carvedilol was increased to 25 mg twice daily due to her atrial flutter and uncontrolled hypertension. 2D echocardiogram then obtained following visit showed normal LV function (EF 61-65%), normal RV size and systolic function, moderately calcified aortic leaflets, no ASN trace aortic regurg.     Today she presents with shortness of breath on exertion over the last couple of days along with increased heart rate.  She also states that she is nervous to go to sleep because when she wakes up in the morning she feels short of breath.  She denies chest pain or dizziness.  She has some inspiratory crackles in the bases.  She also has trace bilateral extremity edema which she says is normal for her.  EKG shows atrial fibrillation with V rate 129.    She has coronary artery disease, hypertension and was newly diagnosed with atrial flutter that was captured on an emergency room visit in October.  At the time of her visit she converted spontaneously to normal sinus rhythm.  She was placed on anticoagulation with Eliquis.  In , she had an anterior wall myocardial infarction and underwent intervention of LAD with " bare-metal stent.  She had a follow-up intervention in 2012 to RCA with drug-eluting stent.  At that time her EF was 45 to 50%.  She has had previous complaints of intermittent palpitations.  On Holter monitor in 2014, there appeared to be some short bursts of nonsustained atrial fibrillation but no persistent arrhythmia, several PACs and some PVCs.  TSH normal.     Past Medical History:   Diagnosis Date   • GERD (gastroesophageal reflux disease)    • Hyperlipidemia    • Ischemic cardiomyopathy    • Myocardial infarction (HCC)          Past Surgical History:   Procedure Laterality Date   • CORONARY ANGIOPLASTY      angioplasties of the LAD and the right coronary artery both with stent placements           Current Outpatient Medications:   •  atorvastatin (LIPITOR) 20 MG tablet, Take 1 tablet by mouth Daily., Disp: , Rfl:   •  isosorbide mononitrate (IMDUR) 30 MG 24 hr tablet, Take 1 tablet by mouth Daily., Disp: 90 tablet, Rfl: 3  •  Multiple Vitamins-Minerals (PRESERVISION AREDS PO), Take  by mouth., Disp: , Rfl:   •  nitroglycerin (NITROSTAT) 0.4 MG SL tablet, Place under the tongue., Disp: , Rfl:   •  Omega-3 Fatty Acids (FISH OIL) 1000 MG capsule capsule, Take  by mouth Daily With Breakfast., Disp: , Rfl:   •  pantoprazole (PROTONIX) 40 MG EC tablet, TAKE ONE TABLET BY MOUTH DAILY, Disp: , Rfl:   •  pantoprazole (PROTONIX) 40 MG EC tablet, Take 1 tablet by mouth Daily., Disp: , Rfl:   •  Pyridoxine HCl (VITAMIN B-6 PO), Take by mouth daily., Disp: , Rfl:   •  dilTIAZem CD (CARDIZEM CD) 240 MG 24 hr capsule, Take 1 capsule by mouth Daily., Disp: 30 capsule, Rfl: 2  •  [START ON 12/30/2021] furosemide (LASIX) 40 MG tablet, Take 1 tablet by mouth Daily., Disp: 3 tablet, Rfl: 0  •  metoprolol tartrate (LOPRESSOR) 50 MG tablet, Take 1 tablet by mouth 2 (Two) Times a Day., Disp: 180 tablet, Rfl: 3  •  pantoprazole (PROTONIX) 20 MG EC tablet, Take 1 tablet by mouth 2 (two) times a day. WILL NEED TO GET FUTURE REFILLS  "THROUGH PCP., Disp: 60 tablet, Rfl: 0  •  [START ON 2021] potassium chloride (K-DUR,KLOR-CON) 20 MEQ CR tablet, Take 1 tablet by mouth Daily for 3 days., Disp: 3 tablet, Rfl: 0      Social History     Socioeconomic History   • Marital status:    Tobacco Use   • Smoking status: Former Smoker     Packs/day: 2.00     Years: 40.00     Pack years: 80.00     Types: Cigarettes     Quit date: 10/14/2009     Years since quittin.2   • Smokeless tobacco: Never Used   • Tobacco comment: caffeine use   Substance and Sexual Activity   • Alcohol use: No   • Drug use: No         Review of Systems   Constitutional: Negative.   HENT: Negative.    Eyes: Negative.    Cardiovascular: Positive for dyspnea on exertion, irregular heartbeat and palpitations.   Respiratory: Positive for shortness of breath.    Endocrine: Negative.    Hematologic/Lymphatic: Negative.         On Eliquis   Skin: Negative.    Musculoskeletal: Negative.    Gastrointestinal: Negative.    Genitourinary: Negative.    Neurological: Negative.    Psychiatric/Behavioral: Negative.    Allergic/Immunologic: Negative.        Procedures      ECG 12 Lead    Date/Time: 2021 2:12 PM  Performed by: Elicia Lemos APRN  Authorized by: Elicia Lemos APRN   Comparison: compared with previous ECG from 2021  Rhythm: atrial fibrillation  BPM: 129  ST Segments: ST segments normal  T inversion: V1    Clinical impression: non-specific ECG                Objective:    /82 (BP Location: Right arm, Patient Position: Sitting)   Pulse (!) 129   Ht 172.7 cm (68\")   Wt 108 kg (238 lb 3.2 oz)   SpO2 96%   BMI 36.22 kg/m²       Physical Exam:   General Appearance:    Alert, cooperative, in no acute distress           Neck:   No thyromegaly, no carotid bruit, no JVD   Lungs:     Insp. Fine crackles in denise bases,respirations regular, even and unlabored    Heart:    Irregular rhythm/rate, normal S1 and S2, no murmur       Abdomen:     Normal bowel " sounds, soft, nontender, nondistended   Extremities:   Moves all extremities well,trace denise LE edema, no cyanosis, no redness   Pulses:   Pulses palpable and equal bilaterally.            Assessment:   Diagnoses and all orders for this visit:    1. Atrial flutter with rapid ventricular response (HCC) (Primary)  -     ECG 12 Lead    2. Essential hypertension  -     ECG 12 Lead    3. Ischemic heart disease  -     ECG 12 Lead    4. Hyperlipidemia LDL goal <70    5. Dyspnea on exertion  -     ECG 12 Lead    6. Acute diastolic congestive heart failure (HCC)    Other orders  -     dilTIAZem CD (CARDIZEM CD) 240 MG 24 hr capsule; Take 1 capsule by mouth Daily.  Dispense: 30 capsule; Refill: 2  -     furosemide (LASIX) 40 MG tablet; Take 1 tablet by mouth Daily.  Dispense: 3 tablet; Refill: 0  -     metoprolol tartrate (LOPRESSOR) 50 MG tablet; Take 1 tablet by mouth 2 (Two) Times a Day.  Dispense: 180 tablet; Refill: 3  -     potassium chloride (K-DUR,KLOR-CON) 20 MEQ CR tablet; Take 1 tablet by mouth Daily for 3 days.  Dispense: 3 tablet; Refill: 0              Plan:   1.  Atrial fibrillation with RVR Vrate 129: Patient is on anticoaguation with Eliquis.  Switched from Coreg to metoprolol tartrate 50 mg twice daily and added diltiazem 240 mg daily for better rate control.  We discussed possibility of EP ablation if unable to control with medications.  2.  Chronic diastolic heart failure: She has a normal EF. Patient has some dyspnea on exertion the last couple of days.  This correlates with her complaints of increased heart rate.  Will have patient take Lasix 40 mg p.o. daily and potassium supplement for the next 3 days as uncontrolled heart rate seems to have resulted in  some diastolic heart failure.   3.  Hypertension: Appears well controlled after Coreg was increased to 25 mg twice daily on last visit.  Patient is being switched to metoprolol tartrate for better rate control.  She will continue to check her blood  pressure.  We discussed that the addition of Lasix and diltiazem could lower her blood pressure more.  4.  Dyspnea on exertion-multifactorial: This is secondary to A. fib RVR, heart failure and anxiety.  5.  Hypercholesterolemia: Target range except for LDL 84 on lipid panel in 2020.  On statin therapy.  6.  Anxiety: Discussed importance of control of breathing.  Taking some deep breaths when she feels herself getting anxious or her heart racing.  7.  Possible obstructive sleep apnea: Recommend outpatient sleep study. Follow with PCP.    Patient will follow up with either myself or Dr. Sinha in one week.  She or her daughter will call with any concerns.

## 2022-01-03 ENCOUNTER — TELEPHONE (OUTPATIENT)
Dept: CARDIOLOGY | Facility: CLINIC | Age: 82
End: 2022-01-03

## 2022-01-03 NOTE — TELEPHONE ENCOUNTER
Ms. Sheldon has some red irritation on her buttock.  Like a whelp lorenza that happened after she saw you.  She was worried it is on of the medications you prescribed.    She comes in at 2 Tuesday, today to see you.    Please advise

## 2022-01-04 ENCOUNTER — OFFICE VISIT (OUTPATIENT)
Dept: CARDIOLOGY | Facility: CLINIC | Age: 82
End: 2022-01-04

## 2022-01-04 VITALS
DIASTOLIC BLOOD PRESSURE: 82 MMHG | HEIGHT: 68 IN | SYSTOLIC BLOOD PRESSURE: 142 MMHG | BODY MASS INDEX: 35.01 KG/M2 | HEART RATE: 112 BPM | WEIGHT: 231 LBS

## 2022-01-04 DIAGNOSIS — I48.91 ATRIAL FIBRILLATION WITH RVR: ICD-10-CM

## 2022-01-04 DIAGNOSIS — I25.9 ISCHEMIC HEART DISEASE: ICD-10-CM

## 2022-01-04 DIAGNOSIS — L25.9 CONTACT DERMATITIS, UNSPECIFIED CONTACT DERMATITIS TYPE, UNSPECIFIED TRIGGER: ICD-10-CM

## 2022-01-04 DIAGNOSIS — R06.09 DYSPNEA ON EXERTION: ICD-10-CM

## 2022-01-04 DIAGNOSIS — I50.33 ACUTE ON CHRONIC DIASTOLIC CHF (CONGESTIVE HEART FAILURE): ICD-10-CM

## 2022-01-04 DIAGNOSIS — I10 ESSENTIAL HYPERTENSION: ICD-10-CM

## 2022-01-04 DIAGNOSIS — I48.92 ATRIAL FLUTTER WITH RAPID VENTRICULAR RESPONSE: Primary | ICD-10-CM

## 2022-01-04 PROBLEM — I50.31 ACUTE DIASTOLIC CHF (CONGESTIVE HEART FAILURE): Status: ACTIVE | Noted: 2022-01-04

## 2022-01-04 PROBLEM — I50.31 ACUTE DIASTOLIC CHF (CONGESTIVE HEART FAILURE) (HCC): Status: RESOLVED | Noted: 2022-01-04 | Resolved: 2022-01-04

## 2022-01-04 PROCEDURE — 93000 ELECTROCARDIOGRAM COMPLETE: CPT | Performed by: NURSE PRACTITIONER

## 2022-01-04 PROCEDURE — 99214 OFFICE O/P EST MOD 30 MIN: CPT | Performed by: NURSE PRACTITIONER

## 2022-01-04 RX ORDER — FUROSEMIDE 40 MG/1
40 TABLET ORAL DAILY
Qty: 3 TABLET | Refills: 0 | Status: SHIPPED | OUTPATIENT
Start: 2022-01-04 | End: 2022-02-07

## 2022-01-04 RX ORDER — POTASSIUM CHLORIDE 20 MEQ/1
20 TABLET, EXTENDED RELEASE ORAL DAILY
Qty: 3 TABLET | Refills: 0 | Status: SHIPPED | OUTPATIENT
Start: 2022-01-04 | End: 2022-01-07

## 2022-01-04 NOTE — PROGRESS NOTES
Date of Office Visit: 2022  Encounter Provider: MIKE Chen  Place of Service: [facility] CARDIOLOGY  Patient Name: Mellisa Sheldon  :1940    No chief complaint on file.  :     HPI: Mellisa Sheldon is a 81 y.o. female who is a patient of Dr. Sinha presents for 1 week follow-up.  I saw her on  when she had complaints of shortness of breath with exertion, palpitations and bilateral lower extremity edema.  EKG showed A. fib with a ventricular rate of 129.  Some medication changes were made.  She was switched from Coreg to metoprolol tartrate as well as diltiazem added for better rate control.  A recent 2D echocardiogram showed normal LV function.  She appeared to have some diastolic heart failure due to her uncontrolled heart rate; therefore, I added Lasix with potassium supplement for 3 days.  An outpatient sleep study was also recommended due to patient with possible obstructive sleep apnea.    Today patient feels much better.  She has no complaints of chest pain or dizziness.  No complaints of palpitations out of the ordinary.  EKG shows A. fib with ventricular rate of 112.  Patient denies any shortness of breath and says that she is sleeping very well now.  On auscultation, I still hear some faint crackles in the bilateral bases of her lungs.  I will continue Lasix 40 mg daily with potassium supplement for 3 more days.  She states that she has lost approximately 7 pounds since last week.  She has an appointment with her primary care provider at the end of February and will ask about scheduling an outpatient sleep study.  Patient has a rash on her right buttocks that started approximately 3 days ago.  Previously, the rash appeared on her left buttocks and underneath her buttocks; however, it has resolved from those areas.  This does not appear to be a reaction to medication but more likely contact dermatitis.  She has topical steroid cream that she  can put on the rash.     Ms. Sheldon has a history of coronary artery disease, hypertension and was newly diagnosed with atrial flutter that was captured on an emergency room visit in October.  The time of her visit she converted spontaneously to normal sinus rhythm.  She was placed on anticoagulation with Eliquis.  In , she had an anterior wall myocardial infarction and underwent intervention of LAD with bare-metal stent.  Due to follow-up intervention in  to RCA with drug-eluting stent.  At that time, her EF was 45 to 50%.  She had previous complaints of intermittent palpitations.  On Holter monitor in , there appeared to be some short bursts of nonsustained atrial fibrillation but no persistent arrhythmia, several PACs and some PVCs.  TSH normal.    Previous testing and notes have been reviewed by me.   Past Medical History:   Diagnosis Date   • GERD (gastroesophageal reflux disease)    • Hyperlipidemia    • Ischemic cardiomyopathy    • Myocardial infarction (HCC)        Past Surgical History:   Procedure Laterality Date   • CORONARY ANGIOPLASTY      angioplasties of the LAD and the right coronary artery both with stent placements       Social History     Socioeconomic History   • Marital status:    Tobacco Use   • Smoking status: Former Smoker     Packs/day: 2.00     Years: 40.00     Pack years: 80.00     Types: Cigarettes     Quit date: 10/14/2009     Years since quittin.2   • Smokeless tobacco: Never Used   • Tobacco comment: caffeine use   Substance and Sexual Activity   • Alcohol use: No   • Drug use: No       History reviewed. No pertinent family history.    Review of Systems   Constitutional: Negative.   HENT: Negative.    Eyes: Negative.    Cardiovascular: Positive for irregular heartbeat and palpitations.   Respiratory: Negative.    Endocrine: Negative.    Skin: Positive for rash.        Right buttocks   Musculoskeletal: Negative.    Gastrointestinal: Negative.    Genitourinary:  "Negative.    Neurological: Negative.    Psychiatric/Behavioral: Negative.    Allergic/Immunologic: Negative.        Allergies   Allergen Reactions   • Diclofenac Sodium Unknown - Low Severity   • Celecoxib Itching   • Ibuprofen Itching   • Niacin Other (See Comments)     unknown   • Nsaids Itching         Current Outpatient Medications:   •  apixaban (ELIQUIS) 5 MG tablet tablet, Take 5 mg by mouth 2 (Two) Times a Day., Disp: , Rfl:   •  atorvastatin (LIPITOR) 20 MG tablet, Take 1 tablet by mouth Daily., Disp: , Rfl:   •  dilTIAZem CD (CARDIZEM CD) 240 MG 24 hr capsule, Take 1 capsule by mouth Daily., Disp: 30 capsule, Rfl: 2  •  isosorbide mononitrate (IMDUR) 30 MG 24 hr tablet, Take 1 tablet by mouth Daily., Disp: 90 tablet, Rfl: 3  •  metoprolol tartrate (LOPRESSOR) 50 MG tablet, Take 1 tablet by mouth 2 (Two) Times a Day., Disp: 180 tablet, Rfl: 3  •  Multiple Vitamins-Minerals (PRESERVISION AREDS PO), Take  by mouth., Disp: , Rfl:   •  nitroglycerin (NITROSTAT) 0.4 MG SL tablet, Place under the tongue., Disp: , Rfl:   •  Omega-3 Fatty Acids (FISH OIL) 1000 MG capsule capsule, Take  by mouth Daily With Breakfast., Disp: , Rfl:   •  pantoprazole (PROTONIX) 40 MG EC tablet, TAKE ONE TABLET BY MOUTH DAILY, Disp: , Rfl:   •  Pyridoxine HCl (VITAMIN B-6 PO), Take by mouth daily., Disp: , Rfl:   •  furosemide (LASIX) 40 MG tablet, Take 1 tablet by mouth Daily for 3 days., Disp: 3 tablet, Rfl: 0  •  potassium chloride (KLOR-CON) 20 MEQ CR tablet, Take 1 tablet by mouth Daily for 3 days., Disp: 3 tablet, Rfl: 0      Objective:     Vitals:    01/04/22 1402   BP: 142/82   Pulse: 112   Weight: 105 kg (231 lb)   Height: 172.7 cm (68\")     Body mass index is 35.12 kg/m².    PHYSICAL EXAM:    Constitutional:       Appearance: Healthy appearance. Not in distress.   Neck:      Vascular: No JVR. JVD normal.   Pulmonary:      Effort: Pulmonary effort is normal.      Breath sounds: No wheezing. No rhonchi. Bibasilar Rales present. "   Chest:      Chest wall: Not tender to palpatation.   Cardiovascular:      PMI at left midclavicular line. Normal rate. Irregularly irregular rhythm. Normal S1. Normal S2.      Murmurs: There is no murmur.      No gallop. No click. No rub.   Pulses:     Intact distal pulses.   Edema:     Ankle: bilateral trace edema of the ankle.  Abdominal:      General: Bowel sounds are normal.      Palpations: Abdomen is soft.      Tenderness: There is no abdominal tenderness.   Musculoskeletal: Normal range of motion.         General: No tenderness. Skin:     General: Skin is warm and dry.   Neurological:      General: No focal deficit present.      Mental Status: Alert and oriented to person, place and time.           ECG 12 Lead    Date/Time: 1/4/2022 2:57 PM  Performed by: Elicia Lemos APRN  Authorized by: Elicia Lemos APRN   Comparison: compared with previous ECG from 12/28/2021  Rhythm: atrial fibrillation  Rate: normal  BPM: 112  ST Segments: ST segments normal  T Waves: T waves normal  QRS axis: normal  Other: no other findings    Clinical impression: non-specific ECG           Assessment:       Diagnosis Plan   1. Atrial flutter with rapid ventricular response (HCC)     2. Atrial fibrillation with RVR (HCC)     3. Dyspnea on exertion     4. Ischemic heart disease     5. Essential hypertension     6. Acute on chronic diastolic CHF (congestive heart failure) (HCC)     7. Contact dermatitis, unspecified contact dermatitis type, unspecified trigger       Orders Placed This Encounter   Procedures   • ECG 12 Lead     This order was created via procedure documentation     Order Specific Question:   Release to patient     Answer:   Immediate          Plan:       1.  Atrial fibrillation: Rate more controlled.  On metoprolol tartrate and diltiazem.  On Eliquis 5mg BID for anticoagulation.   2.  Acute on chronic diastolic heart failure: Normal LV function.  No shortness of air however some crackles in the bilateral  bases.  Will continue Lasix and potassium supplement for another 3 days.  3.  Hypertension: Well-controlled on medication  4.  Ischemic cardiomyopathy: On appropriate medications, normal LV function  5.  Rash: Right buttocks.  Appears as contact dermatitis    Patient has follow-up with her primary care physician at the end of February and will ask about an outpatient sleep study.  She will continue her current medications along with 3 days of Lasix and potassium supplement.  Ms. Sheldon has a follow-up appointment with Dr. Sinha at the end of February, as well.  She will call sooner if needed.         Your medication list          Accurate as of January 4, 2022  3:05 PM. If you have any questions, ask your nurse or doctor.            START taking these medications      Instructions Last Dose Given Next Dose Due   potassium chloride 20 MEQ CR tablet  Commonly known as: KLOR-CON  Started by: MIKE Chen      Take 1 tablet by mouth Daily for 3 days.          CHANGE how you take these medications      Instructions Last Dose Given Next Dose Due   pantoprazole 40 MG EC tablet  Commonly known as: PROTONIX  What changed: Another medication with the same name was removed. Continue taking this medication, and follow the directions you see here.  Changed by: MIKE Chen      TAKE ONE TABLET BY MOUTH DAILY          CONTINUE taking these medications      Instructions Last Dose Given Next Dose Due   apixaban 5 MG tablet tablet  Commonly known as: ELIQUIS      Take 5 mg by mouth 2 (Two) Times a Day.       atorvastatin 20 MG tablet  Commonly known as: LIPITOR      Take 1 tablet by mouth Daily.       dilTIAZem  MG 24 hr capsule  Commonly known as: CARDIZEM CD      Take 1 capsule by mouth Daily.       fish oil 1000 MG capsule capsule      Take  by mouth Daily With Breakfast.       furosemide 40 MG tablet  Commonly known as: LASIX      Take 1 tablet by mouth Daily for 3 days.       isosorbide  mononitrate 30 MG 24 hr tablet  Commonly known as: IMDUR      Take 1 tablet by mouth Daily.       metoprolol tartrate 50 MG tablet  Commonly known as: LOPRESSOR      Take 1 tablet by mouth 2 (Two) Times a Day.       multivitamin with minerals tablet tablet      Take  by mouth.       nitroglycerin 0.4 MG SL tablet  Commonly known as: NITROSTAT      Place under the tongue.       VITAMIN B-6 PO      Take by mouth daily.             Where to Get Your Medications      These medications were sent to [facility] - [address] [facility] [address] [facility] - [phone] [facility] [phone]   [address] [place] [id number]    Phone: [phone]   · furosemide 40 MG tablet  · potassium chloride 20 MEQ CR tablet           As always, it has been a pleasure to participate in your patient's care.      Sincerely,       MIKE Levine

## 2022-01-11 ENCOUNTER — TELEPHONE (OUTPATIENT)
Dept: CARDIOLOGY | Facility: CLINIC | Age: 82
End: 2022-01-11

## 2022-01-11 NOTE — TELEPHONE ENCOUNTER
Ebony,    Patient and daughter called this afternoon. Pt's HR has frequently been elevated in the 130s over the last three days. Her only symptom is feeling like she doesn't have the energy to do her daily tasks. She asked if she needs to increase any doses of her medications. I told them I would call them in the morning after I hear from you. She will go to ER if anything worsens during the evening or night.    Thank you,    Ebony Vidal, RN  Triage MG

## 2022-01-12 NOTE — TELEPHONE ENCOUNTER
Notified pt and her daughter. They verbalized understanding.    Thank you,    Ebony Vidal, RN  Triage MG

## 2022-01-20 NOTE — TELEPHONE ENCOUNTER
Pt called back stating that she feels like when the Metoprolol was increased from 50 mg to 75 mg BID. It did help some. She would like to know if she can continue  this medication regimen or increase the medicine again.     Her daughter also mentioned that for the past couple of nights,  has had some trouble going back to sleep. Her HR is running between 80-90 while resting. She is still constantly in AFIB.     They would like to speak with personally. They can be reached at [phone]    Thanks

## 2022-02-04 ENCOUNTER — TELEPHONE (OUTPATIENT)
Dept: CARDIOLOGY | Facility: CLINIC | Age: 82
End: 2022-02-04

## 2022-02-04 NOTE — TELEPHONE ENCOUNTER
Patient left a voice mail and asked to speak with you.  She just said she had a couple of questions.

## 2022-02-07 ENCOUNTER — TELEPHONE (OUTPATIENT)
Dept: CARDIOLOGY | Facility: CLINIC | Age: 82
End: 2022-02-07

## 2022-02-07 RX ORDER — POTASSIUM CHLORIDE 20 MEQ/1
20 TABLET, EXTENDED RELEASE ORAL DAILY
Qty: 30 TABLET | Refills: 0 | Status: SHIPPED | OUTPATIENT
Start: 2022-02-07 | End: 2022-02-28 | Stop reason: SDUPTHER

## 2022-02-07 RX ORDER — FUROSEMIDE 40 MG/1
40 TABLET ORAL DAILY
Qty: 30 TABLET | Refills: 0 | Status: SHIPPED | OUTPATIENT
Start: 2022-02-07 | End: 2022-02-28 | Stop reason: SDUPTHER

## 2022-02-07 NOTE — TELEPHONE ENCOUNTER
Spoke with patient on the phone today.  She is complaining of some shortness of breath at times.  It does not feel like when she had heart failure after being in uncontrolled A. Fib.  I will send in Lasix and potassium and she will take for 2 days to see if there is any improvement.  If not, then this is likely pulmonary.

## 2022-02-28 ENCOUNTER — TRANSCRIBE ORDERS (OUTPATIENT)
Dept: CARDIOLOGY | Facility: CLINIC | Age: 82
End: 2022-02-28

## 2022-02-28 ENCOUNTER — OFFICE VISIT (OUTPATIENT)
Dept: CARDIOLOGY | Facility: CLINIC | Age: 82
End: 2022-02-28

## 2022-02-28 VITALS
WEIGHT: 227 LBS | OXYGEN SATURATION: 97 % | SYSTOLIC BLOOD PRESSURE: 136 MMHG | HEIGHT: 68 IN | HEART RATE: 115 BPM | BODY MASS INDEX: 34.4 KG/M2 | DIASTOLIC BLOOD PRESSURE: 78 MMHG

## 2022-02-28 DIAGNOSIS — R06.09 DYSPNEA ON EXERTION: ICD-10-CM

## 2022-02-28 DIAGNOSIS — I10 ESSENTIAL HYPERTENSION: ICD-10-CM

## 2022-02-28 DIAGNOSIS — E78.5 HYPERLIPIDEMIA LDL GOAL <70: ICD-10-CM

## 2022-02-28 DIAGNOSIS — Z01.818 OTHER SPECIFIED PRE-OPERATIVE EXAMINATION: ICD-10-CM

## 2022-02-28 DIAGNOSIS — Z13.6 SCREENING FOR CARDIOVASCULAR CONDITION: Primary | ICD-10-CM

## 2022-02-28 DIAGNOSIS — I48.91 ATRIAL FIBRILLATION WITH RVR: Primary | ICD-10-CM

## 2022-02-28 DIAGNOSIS — I25.9 ISCHEMIC HEART DISEASE: ICD-10-CM

## 2022-02-28 DIAGNOSIS — Z01.810 PREPROCEDURAL CARDIOVASCULAR EXAMINATION: ICD-10-CM

## 2022-02-28 DIAGNOSIS — I50.32 CHRONIC DIASTOLIC CONGESTIVE HEART FAILURE: ICD-10-CM

## 2022-02-28 PROCEDURE — 99214 OFFICE O/P EST MOD 30 MIN: CPT | Performed by: INTERNAL MEDICINE

## 2022-02-28 PROCEDURE — 93000 ELECTROCARDIOGRAM COMPLETE: CPT | Performed by: INTERNAL MEDICINE

## 2022-02-28 RX ORDER — FUROSEMIDE 40 MG/1
40 TABLET ORAL EVERY OTHER DAY
Qty: 45 TABLET | Refills: 1 | Status: SHIPPED | OUTPATIENT
Start: 2022-02-28 | End: 2022-03-29

## 2022-02-28 RX ORDER — POTASSIUM CHLORIDE 20 MEQ/1
20 TABLET, EXTENDED RELEASE ORAL EVERY OTHER DAY
Qty: 45 TABLET | Refills: 1 | Status: SHIPPED | OUTPATIENT
Start: 2022-02-28 | End: 2022-08-24

## 2022-02-28 RX ORDER — METOPROLOL TARTRATE 50 MG/1
75 TABLET, FILM COATED ORAL 2 TIMES DAILY
Qty: 180 TABLET | Refills: 3 | Status: SHIPPED | OUTPATIENT
Start: 2022-02-28 | End: 2022-08-24

## 2022-02-28 RX ORDER — AMIODARONE HYDROCHLORIDE 200 MG/1
TABLET ORAL
Qty: 90 TABLET | Refills: 1 | Status: SHIPPED | OUTPATIENT
Start: 2022-02-28 | End: 2022-08-24

## 2022-02-28 NOTE — PROGRESS NOTES
OFFICE VISIT      Date of Office Visit: 2022    Patient Name: Mellisa Sheldon  : 1940    Encounter Provider: Chintan Sinha MD  Referring Provider: No ref. provider found  Primary Care Provider: Marcial Kwon MD  Place of Service: [facility] CARDIOLOGY        Chief Complaint   Patient presents with   • Atrial flutter with rapid ventricular response   • Ischemic heart disease   • Follow-up     History of Present Illness    The patient is an 81-year-old white female with coronary artery disease, atrial fibrillation and diastolic congestive heart failure who returns to the office today for follow-up.    The patient was hospitalized back in 2021.  The patient at that time was noted to have normal left ventricular systolic function but atrial fibrillation with a rapid ventricular response.  Medications have been adjusted and she returns today.  She still feels poorly with complaints of palpitations, shortness of breath, intermittent peripheral edema as well as easy fatigability.  She has had coronary disease with previous stents but does not complain of any angina pectoris.  She does not complain of orthopnea nor paroxysmal nocturnal dyspnea.    Past Medical History:   Diagnosis Date   • GERD (gastroesophageal reflux disease)    • Hyperlipidemia    • Ischemic cardiomyopathy    • Myocardial infarction (HCC)          Past Surgical History:   Procedure Laterality Date   • CORONARY ANGIOPLASTY      angioplasties of the LAD and the right coronary artery both with stent placements           Current Outpatient Medications:   •  apixaban (ELIQUIS) 5 MG tablet tablet, Take 5 mg by mouth 2 (Two) Times a Day., Disp: , Rfl:   •  atorvastatin (LIPITOR) 20 MG tablet, Take 1 tablet by mouth Daily., Disp: , Rfl:   •  dilTIAZem CD (CARDIZEM CD) 240 MG 24 hr capsule, Take 1 capsule by mouth Daily., Disp: 30 capsule, Rfl: 2  •  isosorbide mononitrate (IMDUR) 30 MG 24  hr tablet, Take 1 tablet by mouth Daily., Disp: 90 tablet, Rfl: 3  •  metoprolol tartrate (LOPRESSOR) 50 MG tablet, Take 1.5 tablets by mouth 2 (Two) Times a Day., Disp: 180 tablet, Rfl: 3  •  Multiple Vitamins-Minerals (PRESERVISION AREDS PO), Take  by mouth., Disp: , Rfl:   •  nitroglycerin (NITROSTAT) 0.4 MG SL tablet, Place under the tongue., Disp: , Rfl:   •  Omega-3 Fatty Acids (FISH OIL) 1000 MG capsule capsule, Take  by mouth Daily With Breakfast., Disp: , Rfl:   •  pantoprazole (PROTONIX) 40 MG EC tablet, TAKE ONE TABLET BY MOUTH DAILY, Disp: , Rfl:   •  potassium chloride (KLOR-CON) 20 MEQ CR tablet, Take 1 tablet by mouth Daily for 30 days., Disp: 30 tablet, Rfl: 0  •  Pyridoxine HCl (VITAMIN B-6 PO), Take by mouth daily., Disp: , Rfl:   •  amiodarone (PACERONE) 200 MG tablet, Take 1 BID x 2 weeks then 1 PO daily, Disp: 90 tablet, Rfl: 1  •  furosemide (LASIX) 40 MG tablet, Take 1 tablet by mouth Daily for 2 days., Disp: 30 tablet, Rfl: 0      Social History     Socioeconomic History   • Marital status:    Tobacco Use   • Smoking status: Former Smoker     Packs/day: 2.00     Years: 40.00     Pack years: 80.00     Types: Cigarettes     Quit date: 10/14/2009     Years since quittin.3   • Smokeless tobacco: Never Used   • Tobacco comment: caffeine use   Substance and Sexual Activity   • Alcohol use: No   • Drug use: No         Review of Systems   Constitutional: Positive for malaise/fatigue.   HENT: Negative.    Eyes: Negative.    Cardiovascular: Positive for dyspnea on exertion, leg swelling and palpitations.   Respiratory: Negative.    Endocrine: Negative.    Skin: Negative.    Musculoskeletal: Negative.    Gastrointestinal: Negative.    Neurological: Negative.    Psychiatric/Behavioral: Negative.        Procedures      ECG 12 Lead    Date/Time: 2022 3:35 PM  Performed by: Chintan Sinha MD  Authorized by: Chintan Sinha MD   Comparison: compared with previous ECG from  "1/4/2022  Similar to previous ECG  Rhythm: atrial fibrillation  Rate: tachycardic  Conduction: conduction normal  QRS axis: normal  Other findings: non-specific ST-T wave changes                Objective:    /78 (BP Location: Right arm, Patient Position: Sitting)   Pulse 115   Ht 172.7 cm (68\")   Wt 103 kg (227 lb)   SpO2 97%   BMI 34.52 kg/m²         Vitals reviewed.   Constitutional:       Appearance: Healthy appearance. Well-developed and not in distress.   HENT:      Head: Normocephalic.   Neck:      Thyroid: No thyromegaly.      Vascular: No carotid bruit or JVD.   Pulmonary:      Effort: Pulmonary effort is normal.      Breath sounds: Normal breath sounds.   Cardiovascular:      Tachycardia present. Irregularly irregular rhythm. Normal S1. Normal S2.      Murmurs: There is no murmur.      No gallop. No click. No rub.   Pulses:     Intact distal pulses.   Edema:     Peripheral edema present.     Ankle: bilateral 1+ edema of the ankle.     Feet: bilateral 1+ edema of the feet.  Musculoskeletal:      Cervical back: Normal range of motion. Skin:     General: Skin is warm and dry.      Findings: No erythema.   Neurological:      Mental Status: Alert and oriented to person, place, and time.             Assessment & Plan:       Diagnosis Plan   1. Atrial fibrillation with RVR (HCC)  Cardioversion External in Cardiology Department   2. Essential hypertension     3. Dyspnea on exertion     4. Chronic diastolic congestive heart failure (HCC)     5. Hyperlipidemia LDL goal <70     6. Ischemic heart disease         1.  Atrial fibrillation with inadequately controlled heart rate.  Patient remains on apixaban, metoprolol and diltiazem.  I am going to plan cardioversion after starting amiodarone.  2.  Chronic diastolic heart failure: We will have her take her Lasix and potassium every other day.  Now she uses it only as needed  3.  Hypertension: Controlled  4.  Chronic exertional dyspnea: Multifactorial  5.  " Coronary artery disease: Previous intervention.  No angina  6.  Hyperlipidemia:She remains on statin therapy levels at target by previous lipid panel

## 2022-03-11 ENCOUNTER — LAB (OUTPATIENT)
Dept: LAB | Facility: HOSPITAL | Age: 82
End: 2022-03-11

## 2022-03-11 DIAGNOSIS — Z13.6 SCREENING FOR CARDIOVASCULAR CONDITION: ICD-10-CM

## 2022-03-11 DIAGNOSIS — Z01.810 PREPROCEDURAL CARDIOVASCULAR EXAMINATION: ICD-10-CM

## 2022-03-11 DIAGNOSIS — Z01.818 OTHER SPECIFIED PRE-OPERATIVE EXAMINATION: ICD-10-CM

## 2022-03-11 LAB — SARS-COV-2 ORF1AB RESP QL NAA+PROBE: NOT DETECTED

## 2022-03-11 PROCEDURE — U0005 INFEC AGEN DETEC AMPLI PROBE: HCPCS

## 2022-03-11 PROCEDURE — U0004 COV-19 TEST NON-CDC HGH THRU: HCPCS

## 2022-03-11 PROCEDURE — C9803 HOPD COVID-19 SPEC COLLECT: HCPCS

## 2022-03-14 ENCOUNTER — ANESTHESIA EVENT (OUTPATIENT)
Dept: POSTOP/PACU | Facility: HOSPITAL | Age: 82
End: 2022-03-14

## 2022-03-14 ENCOUNTER — ANESTHESIA (OUTPATIENT)
Dept: POSTOP/PACU | Facility: HOSPITAL | Age: 82
End: 2022-03-14

## 2022-03-14 ENCOUNTER — HOSPITAL ENCOUNTER (OUTPATIENT)
Dept: POSTOP/PACU | Facility: HOSPITAL | Age: 82
Discharge: HOME | End: 2022-03-14

## 2022-03-14 VITALS — SYSTOLIC BLOOD PRESSURE: 105 MMHG | DIASTOLIC BLOOD PRESSURE: 55 MMHG | OXYGEN SATURATION: 100 % | HEART RATE: 65 BPM

## 2022-03-14 VITALS
TEMPERATURE: 99.3 F | DIASTOLIC BLOOD PRESSURE: 75 MMHG | SYSTOLIC BLOOD PRESSURE: 107 MMHG | HEART RATE: 64 BPM | OXYGEN SATURATION: 100 % | RESPIRATION RATE: 16 BRPM

## 2022-03-14 DIAGNOSIS — I48.91 ATRIAL FIBRILLATION WITH RVR: ICD-10-CM

## 2022-03-14 LAB
ANION GAP SERPL CALCULATED.3IONS-SCNC: 10.9 MMOL/L (ref 5–15)
BUN SERPL-MCNC: 20 MG/DL (ref 8–23)
BUN/CREAT SERPL: 18.2 (ref 7–25)
CALCIUM SPEC-SCNC: 9.3 MG/DL (ref 8.6–10.5)
CHLORIDE SERPL-SCNC: 105 MMOL/L (ref 98–107)
CO2 SERPL-SCNC: 26.1 MMOL/L (ref 22–29)
CREAT SERPL-MCNC: 1.1 MG/DL (ref 0.57–1)
EGFRCR SERPLBLD CKD-EPI 2021: 50.3 ML/MIN/1.73
GLUCOSE SERPL-MCNC: 107 MG/DL (ref 65–99)
POTASSIUM SERPL-SCNC: 3.7 MMOL/L (ref 3.5–5.2)
QT INTERVAL: 447 MS
SODIUM SERPL-SCNC: 142 MMOL/L (ref 136–145)

## 2022-03-14 PROCEDURE — 93010 ELECTROCARDIOGRAM REPORT: CPT | Performed by: INTERNAL MEDICINE

## 2022-03-14 PROCEDURE — 92960 CARDIOVERSION ELECTRIC EXT: CPT

## 2022-03-14 PROCEDURE — 80048 BASIC METABOLIC PNL TOTAL CA: CPT | Performed by: INTERNAL MEDICINE

## 2022-03-14 PROCEDURE — 93005 ELECTROCARDIOGRAM TRACING: CPT | Performed by: INTERNAL MEDICINE

## 2022-03-14 PROCEDURE — 92960 CARDIOVERSION ELECTRIC EXT: CPT | Performed by: INTERNAL MEDICINE

## 2022-03-14 PROCEDURE — 25010000002 PROPOFOL 10 MG/ML EMULSION: Performed by: NURSE ANESTHETIST, CERTIFIED REGISTERED

## 2022-03-14 RX ORDER — MIDAZOLAM HYDROCHLORIDE 1 MG/ML
0.5 INJECTION INTRAMUSCULAR; INTRAVENOUS
Status: CANCELLED | OUTPATIENT
Start: 2022-03-14

## 2022-03-14 RX ORDER — LIDOCAINE HYDROCHLORIDE 10 MG/ML
0.5 INJECTION, SOLUTION EPIDURAL; INFILTRATION; INTRACAUDAL; PERINEURAL ONCE AS NEEDED
Status: CANCELLED | OUTPATIENT
Start: 2022-03-14

## 2022-03-14 RX ORDER — FENTANYL CITRATE 50 UG/ML
50 INJECTION, SOLUTION INTRAMUSCULAR; INTRAVENOUS
Status: CANCELLED | OUTPATIENT
Start: 2022-03-14

## 2022-03-14 RX ORDER — SODIUM CHLORIDE 0.9 % (FLUSH) 0.9 %
3 SYRINGE (ML) INJECTION EVERY 12 HOURS SCHEDULED
Status: CANCELLED | OUTPATIENT
Start: 2022-03-14

## 2022-03-14 RX ORDER — PROPOFOL 10 MG/ML
VIAL (ML) INTRAVENOUS AS NEEDED
Status: DISCONTINUED | OUTPATIENT
Start: 2022-03-14 | End: 2022-03-14 | Stop reason: SURG

## 2022-03-14 RX ORDER — FAMOTIDINE 10 MG/ML
20 INJECTION, SOLUTION INTRAVENOUS ONCE
Status: CANCELLED | OUTPATIENT
Start: 2022-03-14 | End: 2022-03-14

## 2022-03-14 RX ORDER — SODIUM CHLORIDE 9 MG/ML
INJECTION, SOLUTION INTRAVENOUS CONTINUOUS PRN
Status: DISCONTINUED | OUTPATIENT
Start: 2022-03-14 | End: 2022-03-14 | Stop reason: SURG

## 2022-03-14 RX ORDER — SODIUM CHLORIDE, SODIUM LACTATE, POTASSIUM CHLORIDE, CALCIUM CHLORIDE 600; 310; 30; 20 MG/100ML; MG/100ML; MG/100ML; MG/100ML
9 INJECTION, SOLUTION INTRAVENOUS CONTINUOUS
Status: CANCELLED | OUTPATIENT
Start: 2022-03-14

## 2022-03-14 RX ORDER — SODIUM CHLORIDE 0.9 % (FLUSH) 0.9 %
3-10 SYRINGE (ML) INJECTION AS NEEDED
Status: CANCELLED | OUTPATIENT
Start: 2022-03-14

## 2022-03-14 RX ADMIN — SODIUM CHLORIDE: 9 INJECTION, SOLUTION INTRAVENOUS at 07:40

## 2022-03-14 RX ADMIN — PROPOFOL 80 MG: 10 INJECTION, EMULSION INTRAVENOUS at 07:45

## 2022-03-14 NOTE — ANESTHESIA PREPROCEDURE EVALUATION
Anesthesia Evaluation     Patient summary reviewed and Nursing notes reviewed                Airway   Mallampati: III  TM distance: >3 FB  Dental      Pulmonary    (+) a smoker Former, COPD, shortness of breath,   Cardiovascular     ECG reviewed  PT is on anticoagulation therapy  Patient on routine beta blocker and Beta blocker given within 24 hours of surgery  Rhythm: irregular  Rate: normal    (+) hypertension, past MI , dysrhythmias Atrial Fib, Atrial Flutter, CHF , hyperlipidemia,       Neuro/Psych- negative ROS  GI/Hepatic/Renal/Endo    (+) morbid obesity, GERD,      Musculoskeletal (-) negative ROS    Abdominal    Substance History - negative use     OB/GYN negative ob/gyn ROS         Other                      Anesthesia Plan    ASA 4     general   (I have reviewed the patient's history with the patient and the chart, including all pertinent laboratory results and imaging. I have explained the risks of anesthesia including but not limited to dental damage, corneal abrasion, nerve injury, MI, stroke, and death. Questions asked and answered. Anesthetic plan discussed with patient and team as indicated. Patient expressed understanding of the above.  )  intravenous induction     Anesthetic plan, all risks, benefits, and alternatives have been provided, discussed and informed consent has been obtained with: patient.        CODE STATUS:

## 2022-03-14 NOTE — ANESTHESIA POSTPROCEDURE EVALUATION
Patient: Mellisa Sheldon    Procedure Summary     Date: 03/14/22 Room / Location: [facility] PACU    Anesthesia Start: 0740 Anesthesia Stop:     Procedure: CARDIOVERSION EXTERNAL IN CARDIOLOGY DEPARTMENT Diagnosis:       Atrial fibrillation with RVR (HCC)      (afib)    Scheduled Providers: Chintan Sinha MD Provider: Antonio Martino MD    Anesthesia Type: general ASA Status: 4          Anesthesia Type: general    Vitals  Vitals Value Taken Time   /61 03/14/22 0811   Temp     Pulse 64 03/14/22 0811   Resp 16 03/14/22 0810   SpO2 94 % 03/14/22 0811   Vitals shown include unvalidated device data.        Post Anesthesia Care and Evaluation    Patient location during evaluation: bedside  Patient participation: complete - patient participated  Level of consciousness: awake and alert  Pain management: adequate  Airway patency: patent  Anesthetic complications: No anesthetic complications  PONV Status: controlled  Cardiovascular status: acceptable and hemodynamically stable  Respiratory status: acceptable, spontaneous ventilation and nonlabored ventilation  Hydration status: acceptable    Comments: /75   Pulse 64   Temp 37.4 °C (99.3 °F) (Oral)   Resp 16   SpO2 100%

## 2022-03-22 ENCOUNTER — TELEPHONE (OUTPATIENT)
Dept: CARDIOLOGY | Facility: CLINIC | Age: 82
End: 2022-03-22

## 2022-03-22 NOTE — TELEPHONE ENCOUNTER
Pt left a VM a little confused about her f/u.    She states she has a f/u appt to see RL 6wks from 3/14.  She is scheduled to see RL 4/25.    She wanted to know if she is supposed to talk to you, 2 wks from the 14th, or actually come in and see you.    Please advise.

## 2022-03-29 ENCOUNTER — OFFICE VISIT (OUTPATIENT)
Dept: CARDIOLOGY | Facility: CLINIC | Age: 82
End: 2022-03-29

## 2022-03-29 VITALS
BODY MASS INDEX: 33.92 KG/M2 | HEIGHT: 68 IN | HEART RATE: 52 BPM | WEIGHT: 223.8 LBS | SYSTOLIC BLOOD PRESSURE: 122 MMHG | DIASTOLIC BLOOD PRESSURE: 54 MMHG

## 2022-03-29 DIAGNOSIS — G47.10 HYPERSOMNOLENT: Primary | ICD-10-CM

## 2022-03-29 DIAGNOSIS — I50.32 CHRONIC DIASTOLIC CONGESTIVE HEART FAILURE: ICD-10-CM

## 2022-03-29 DIAGNOSIS — I25.9 ISCHEMIC HEART DISEASE: ICD-10-CM

## 2022-03-29 DIAGNOSIS — I48.91 ATRIAL FIBRILLATION STATUS POST CARDIOVERSION: ICD-10-CM

## 2022-03-29 PROCEDURE — 99214 OFFICE O/P EST MOD 30 MIN: CPT | Performed by: NURSE PRACTITIONER

## 2022-03-29 PROCEDURE — 93000 ELECTROCARDIOGRAM COMPLETE: CPT | Performed by: NURSE PRACTITIONER

## 2022-03-29 RX ORDER — NITROGLYCERIN 0.4 MG/1
0.4 TABLET SUBLINGUAL
Qty: 30 TABLET | Refills: 1 | Status: CANCELLED | OUTPATIENT
Start: 2022-03-29

## 2022-03-29 RX ORDER — FUROSEMIDE 40 MG/1
40 TABLET ORAL DAILY PRN
Qty: 45 TABLET | Refills: 1 | Status: SHIPPED | OUTPATIENT
Start: 2022-03-29 | End: 2022-08-24

## 2022-03-29 RX ORDER — DILTIAZEM HYDROCHLORIDE 120 MG/1
120 CAPSULE, COATED, EXTENDED RELEASE ORAL DAILY
Qty: 30 CAPSULE | Refills: 1 | Status: SHIPPED | OUTPATIENT
Start: 2022-03-29 | End: 2022-05-25

## 2022-03-29 NOTE — PROGRESS NOTES
Date of Office Visit: 2022  Encounter Provider: MIKE Chen  Place of Service: [facility] CARDIOLOGY  Patient Name: Mellisa Sheldon  :1940    Chief Complaint   Patient presents with   • Atrial Flutter   :     HPI: Mellisa Sheldon is a 81 y.o. female who is a patient of Dr. Sinha.  She presents today for a 2-week follow-up post A. fib cardioversion.  She has history of difficult to control atrial fibrillation.  She underwent successful external cardioversion with Dr. Sinha on 3/14/2022.  She continued on amiodarone 200 mg twice daily for 2 weeks along with her diltiazem, metoprolol and Eliquis.  Now on daily amiodarone and Eliquis.    Could also has a history of coronary artery disease, hypertension.  In , she had an anterior wall myocardial infarction and underwent intervention of LAD with bare-metal stent.  She underwent follow-up intervention in  to RCA with drug-eluting stent.  At time, her EF was 45 to 50%.    Today she presents with no complaints of palpitations, chest pain or dizziness.  She states that she is a little tired and somewhat short of breath at times but feels much better.  Breath sounds are clear.  Lower extremity edema is unchanged from her normal.  EKG shows sinus rhythm, rate 52.  Blood pressure is well controlled.  Patient notes that her HR has been in the 50's consistently according to her Apple watch.  She has never had sleep study and is willing to undergo evaluation for sleep apnea.        Previous testing and notes have been reviewed by me.   Past Medical History:   Diagnosis Date   • GERD (gastroesophageal reflux disease)    • Hyperlipidemia    • Ischemic cardiomyopathy    • Myocardial infarction (HCC)        Past Surgical History:   Procedure Laterality Date   • CORONARY ANGIOPLASTY      angioplasties of the LAD and the right coronary artery both with stent placements       Social History     Socioeconomic History    • Marital status:    Tobacco Use   • Smoking status: Former Smoker     Packs/day: 2.00     Years: 40.00     Pack years: 80.00     Types: Cigarettes     Quit date: 10/14/2009     Years since quittin.4   • Smokeless tobacco: Never Used   • Tobacco comment: caffeine use : 2 cups daily.   Vaping Use   • Vaping Use: Never used   Substance and Sexual Activity   • Alcohol use: No   • Drug use: No       No family history on file.    Review of Systems   Constitutional: Negative.   HENT: Negative.    Eyes: Negative.    Cardiovascular: Negative.    Respiratory: Negative.    Endocrine: Negative.    Hematologic/Lymphatic:        On Eliquis 5 mg twice daily   Skin: Negative.    Musculoskeletal: Negative.    Gastrointestinal: Negative.    Genitourinary: Negative.    Neurological: Negative.    Psychiatric/Behavioral: Negative.    Allergic/Immunologic: Negative.        Allergies   Allergen Reactions   • Diclofenac Sodium Unknown - Low Severity   • Celecoxib Itching   • Ibuprofen Itching   • Niacin Other (See Comments)     unknown   • Nsaids Itching         Current Outpatient Medications:   •  amiodarone (PACERONE) 200 MG tablet, Take 1 BID x 2 weeks then 1 PO daily, Disp: 90 tablet, Rfl: 1  •  apixaban (ELIQUIS) 5 MG tablet tablet, Take 5 mg by mouth 2 (Two) Times a Day., Disp: , Rfl:   •  atorvastatin (LIPITOR) 20 MG tablet, Take 1 tablet by mouth Daily., Disp: , Rfl:   •  dilTIAZem CD (CARDIZEM CD) 120 MG 24 hr capsule, Take 1 capsule by mouth Daily., Disp: 30 capsule, Rfl: 1  •  furosemide (LASIX) 40 MG tablet, Take 1 tablet by mouth Daily As Needed (shortness of breath and/ or lower extremity edema)., Disp: 45 tablet, Rfl: 1  •  isosorbide mononitrate (IMDUR) 30 MG 24 hr tablet, Take 1 tablet by mouth Daily., Disp: 90 tablet, Rfl: 3  •  metoprolol tartrate (LOPRESSOR) 50 MG tablet, Take 1.5 tablets by mouth 2 (Two) Times a Day., Disp: 180 tablet, Rfl: 3  •  Multiple Vitamins-Minerals (PRESERVISION AREDS PO), Take   "by mouth., Disp: , Rfl:   •  nitroglycerin (NITROSTAT) 0.4 MG SL tablet, Place under the tongue., Disp: , Rfl:   •  Omega-3 Fatty Acids (FISH OIL) 1000 MG capsule capsule, Take 2,000 mg by mouth Daily With Breakfast., Disp: , Rfl:   •  pantoprazole (PROTONIX) 40 MG EC tablet, TAKE ONE TABLET BY MOUTH DAILY, Disp: , Rfl:   •  potassium chloride (KLOR-CON) 20 MEQ CR tablet, Take 1 tablet by mouth Every Other Day., Disp: 45 tablet, Rfl: 1  •  Pyridoxine HCl (VITAMIN B-6 PO), Take by mouth daily., Disp: , Rfl:       Objective:     Vitals:    03/29/22 1329   BP: 122/54   BP Location: Right arm   Pulse: 52   Weight: 102 kg (223 lb 12.8 oz)   Height: 172.7 cm (68\")     Body mass index is 34.03 kg/m².     Cardiac studies:  2D Echocardiogram 11/12/2021:  LVEF 61 to 65%, normal LV function, mild LVH, 1 diastolic dysfunction  Normal RV size and systolic function  Normal LA and RA size  Trace AI, moderately calcified AV leaflets  Normal IVC inspiratory collapse      PHYSICAL EXAM:    Constitutional:       Appearance: Healthy appearance. Not in distress.   Neck:      Vascular: No JVR. JVD normal.   Pulmonary:      Effort: Pulmonary effort is normal.      Breath sounds: Normal breath sounds. No wheezing. No rhonchi. No rales.   Chest:      Chest wall: Not tender to palpatation.   Cardiovascular:      PMI at left midclavicular line. Normal rate. Regular rhythm. Normal S1. Normal S2.      Murmurs: There is no murmur.      No gallop. No click. No rub.   Pulses:     Intact distal pulses.   Edema:     Peripheral edema present.     Pretibial: bilateral edema of the pretibial area.     Ankle: bilateral edema of the ankle.     Feet: bilateral edema of the feet.  Abdominal:      General: Bowel sounds are normal.      Palpations: Abdomen is soft.      Tenderness: There is no abdominal tenderness.   Musculoskeletal: Normal range of motion.         General: No tenderness. Skin:     General: Skin is warm and dry.   Neurological:      General: " No focal deficit present.      Mental Status: Alert and oriented to person, place and time.           ECG 12 Lead    Date/Time: 3/29/2022 2:30 PM  Performed by: Elicia Lemos APRN  Authorized by: Elicia Lemos APRN   Comparison: compared with previous ECG from 3/14/2022  Rhythm: sinus rhythm  Rate: normal  BPM: 52  ST Segments: ST segments normal  T Waves: T waves normal  QRS axis: normal                Assessment:       Diagnosis Plan   1. Hypersomnolent  Ambulatory Referral to Sleep Medicine   2. Atrial fibrillation status post cardioversion (HCC)     3. Chronic diastolic congestive heart failure (HCC)     4. Ischemic heart disease       Orders Placed This Encounter   Procedures   • Ambulatory Referral to Sleep Medicine     Referral Priority:   Routine     Referral Type:   Consultation     Referral Reason:   Specialty Services Required     Referred to Provider:   Marcial Jaime MD     Requested Specialty:   Sleep Medicine     Number of Visits Requested:   1   • ECG 12 Lead     This order was created via procedure documentation     Order Specific Question:   Release to patient     Answer:   Immediate          Plan:       1.  Atrial fibrillation status post successful external cardioversion: Remains on amiodarone 200 mg daily, metoprolol and anticoagulation with Eliquis.  Decreased diltiazem to 120 mg daily due to heart rate remaining in the 50s.  Patient will continue to check her heart rate and call if it remains low.  At that time we may stop diltiazem.  2.  Chronic diastolic heart failure: Normal LV function.  Change Lasix to daily as needed for shortness of air or increase in bilateral lower extremity edema.   3.  Hypertension: Well-controlled on current medications  4.  Coronary artery disease s/p PCI/ BMS LAD and DRE RCA: No angina.  EKG no acute changes.  On appropriate medication  5.  Hypersomnolent: Referral for sleep study    Ms. Sheldon will follow-up with Dr. Sinha in 6 weeks.  She  will monitor her blood pressure as well as heart rate.             Your medication list          Accurate as of March 29, 2022  2:33 PM. If you have any questions, ask your nurse or doctor.            CHANGE how you take these medications      Instructions Last Dose Given Next Dose Due   dilTIAZem  MG 24 hr capsule  Commonly known as: CARDIZEM CD  What changed:   · medication strength  · how much to take  Changed by: MIKE Chen      Take 1 capsule by mouth Daily.       furosemide 40 MG tablet  Commonly known as: LASIX  What changed:   · when to take this  · reasons to take this  Changed by: MIKE Chen      Take 1 tablet by mouth Daily As Needed (shortness of breath and/ or lower extremity edema).          CONTINUE taking these medications      Instructions Last Dose Given Next Dose Due   amiodarone 200 MG tablet  Commonly known as: PACERONE      Take 1 BID x 2 weeks then 1 PO daily       apixaban 5 MG tablet tablet  Commonly known as: ELIQUIS      Take 5 mg by mouth 2 (Two) Times a Day.       atorvastatin 20 MG tablet  Commonly known as: LIPITOR      Take 1 tablet by mouth Daily.       fish oil 1000 MG capsule capsule      Take 2,000 mg by mouth Daily With Breakfast.       isosorbide mononitrate 30 MG 24 hr tablet  Commonly known as: IMDUR      Take 1 tablet by mouth Daily.       metoprolol tartrate 50 MG tablet  Commonly known as: LOPRESSOR      Take 1.5 tablets by mouth 2 (Two) Times a Day.       multivitamin with minerals tablet tablet      Take  by mouth.       nitroglycerin 0.4 MG SL tablet  Commonly known as: NITROSTAT      Place under the tongue.       pantoprazole 40 MG EC tablet  Commonly known as: PROTONIX      TAKE ONE TABLET BY MOUTH DAILY       potassium chloride 20 MEQ CR tablet  Commonly known as: KLOR-CON      Take 1 tablet by mouth Every Other Day.       VITAMIN B-6 PO      Take by mouth daily.             Where to Get Your Medications      These medications were sent  to [facility] [address] - [place], [place] - [address] AT [facility] - [phone]  - [phone]   [address] [place] [address]    Phone: [phone]   · dilTIAZem  MG 24 hr capsule  · furosemide 40 MG tablet           As always, it has been a pleasure to participate in your patient's care.      Sincerely,       MIKE Levine

## 2022-03-30 RX ORDER — DILTIAZEM HYDROCHLORIDE 240 MG/1
CAPSULE, COATED, EXTENDED RELEASE ORAL
Qty: 30 CAPSULE | Refills: 2 | OUTPATIENT
Start: 2022-03-30

## 2022-04-18 ENCOUNTER — TELEPHONE (OUTPATIENT)
Dept: CARDIOLOGY | Facility: CLINIC | Age: 82
End: 2022-04-18

## 2022-04-18 RX ORDER — DILTIAZEM HYDROCHLORIDE 240 MG/1
CAPSULE, COATED, EXTENDED RELEASE ORAL
Qty: 30 CAPSULE | Refills: 2 | OUTPATIENT
Start: 2022-04-18

## 2022-04-18 NOTE — TELEPHONE ENCOUNTER
MsWilbert Monalisa called stating she was supposed to call you in 2 weeks.      Also wanted to let you know she had a nose bleed that lasted for about 30 minutes.    Please advise

## 2022-04-19 NOTE — TELEPHONE ENCOUNTER
Pt is needing to speak with Dr. Sinha urgently:    Pain in hands: patient's pain has increased in the last few days, she only had pain in one hand for about a month or so but now its in both hands.     Heart beat feels like its coming out of her chest this has been going for a couple of weeks. Pt's daughter said she is very stubborn about this and she is worried since it seems to be getting worse.     Patient has also been SOB, and also more fatigue more then usual.     Pt can be reached at [phone]  
Pt was seen today  
[employer] dose 1, 2, and 3

## 2022-04-19 NOTE — TELEPHONE ENCOUNTER
Advised Mrs. Sheldon to hold Eliquis for three days and restart on day 4 taking 1/2 tablet BID.  She verbalized understanding.

## 2022-04-20 RX ORDER — DILTIAZEM HYDROCHLORIDE 240 MG/1
CAPSULE, COATED, EXTENDED RELEASE ORAL
Qty: 30 CAPSULE | Refills: 2 | OUTPATIENT
Start: 2022-04-20

## 2022-05-04 ENCOUNTER — OFFICE VISIT (OUTPATIENT)
Dept: CARDIOLOGY | Facility: CLINIC | Age: 82
End: 2022-05-04

## 2022-05-04 VITALS
DIASTOLIC BLOOD PRESSURE: 74 MMHG | OXYGEN SATURATION: 98 % | HEART RATE: 67 BPM | WEIGHT: 221.6 LBS | BODY MASS INDEX: 33.59 KG/M2 | SYSTOLIC BLOOD PRESSURE: 134 MMHG | HEIGHT: 68 IN

## 2022-05-04 DIAGNOSIS — I25.9 ISCHEMIC HEART DISEASE: ICD-10-CM

## 2022-05-04 DIAGNOSIS — I48.91 ATRIAL FIBRILLATION STATUS POST CARDIOVERSION: Primary | ICD-10-CM

## 2022-05-04 DIAGNOSIS — I50.32 CHRONIC DIASTOLIC CONGESTIVE HEART FAILURE: ICD-10-CM

## 2022-05-04 DIAGNOSIS — I10 ESSENTIAL HYPERTENSION: ICD-10-CM

## 2022-05-04 DIAGNOSIS — R04.0 EPISTAXIS: ICD-10-CM

## 2022-05-04 PROCEDURE — 93000 ELECTROCARDIOGRAM COMPLETE: CPT | Performed by: INTERNAL MEDICINE

## 2022-05-04 PROCEDURE — 99214 OFFICE O/P EST MOD 30 MIN: CPT | Performed by: INTERNAL MEDICINE

## 2022-05-04 NOTE — PROGRESS NOTES
OFFICE VISIT      Date of Office Visit: 2022    Patient Name: Mellisa Sheldon  : 1940    Encounter Provider: Chintan Sinha MD  Referring Provider: No ref. provider found  Primary Care Provider: Marcial Kwon MD  Place of Service: [facility] CARDIOLOGY        Chief Complaint   Patient presents with   • Hospital Follow Up Visit     History of Present Illness    The patient is an 81-year-old white female with coronary artery disease, hypertension and atrial fibrillation.    The patient experienced an anterior wall myocardial infarction in  requiring intervention to the LAD.  She had subsequent intervention  to the right coronary artery.  Her ejection fraction was mildly reduced to 45 to 50%.  She currently does not complain of angina pectoris.  She recently was seen for atrial fibrillation flutter.  She ultimately underwent cardioversion.  She remains anticoagulated with apixaban and is on amiodarone suppressive therapy.  She has been having episodes of epistaxis.  Her Eliquis was cut to 2.5 mg twice daily from 5 mg twice daily.  She has had 1 additional episode.    Past Medical History:   Diagnosis Date   • GERD (gastroesophageal reflux disease)    • Hyperlipidemia    • Ischemic cardiomyopathy    • Myocardial infarction (HCC)          Past Surgical History:   Procedure Laterality Date   • CORONARY ANGIOPLASTY      angioplasties of the LAD and the right coronary artery both with stent placements           Current Outpatient Medications:   •  amiodarone (PACERONE) 200 MG tablet, Take 1 BID x 2 weeks then 1 PO daily, Disp: 90 tablet, Rfl: 1  •  apixaban (ELIQUIS) 5 MG tablet tablet, Take 2.5 mg by mouth 2 (Two) Times a Day., Disp: , Rfl:   •  atorvastatin (LIPITOR) 20 MG tablet, Take 1 tablet by mouth Daily., Disp: , Rfl:   •  dilTIAZem CD (CARDIZEM CD) 120 MG 24 hr capsule, Take 1 capsule by mouth Daily., Disp: 30 capsule, Rfl: 1  •  furosemide  (LASIX) 40 MG tablet, Take 1 tablet by mouth Daily As Needed (shortness of breath and/ or lower extremity edema)., Disp: 45 tablet, Rfl: 1  •  isosorbide mononitrate (IMDUR) 30 MG 24 hr tablet, Take 1 tablet by mouth Daily., Disp: 90 tablet, Rfl: 3  •  metoprolol tartrate (LOPRESSOR) 50 MG tablet, Take 1.5 tablets by mouth 2 (Two) Times a Day., Disp: 180 tablet, Rfl: 3  •  Multiple Vitamins-Minerals (PRESERVISION AREDS PO), Take  by mouth., Disp: , Rfl:   •  nitroglycerin (NITROSTAT) 0.4 MG SL tablet, Place under the tongue., Disp: , Rfl:   •  Omega-3 Fatty Acids (FISH OIL) 1000 MG capsule capsule, Take 2,000 mg by mouth Daily With Breakfast., Disp: , Rfl:   •  pantoprazole (PROTONIX) 40 MG EC tablet, TAKE ONE TABLET BY MOUTH DAILY, Disp: , Rfl:   •  potassium chloride (KLOR-CON) 20 MEQ CR tablet, Take 1 tablet by mouth Every Other Day., Disp: 45 tablet, Rfl: 1  •  Pyridoxine HCl (VITAMIN B-6 PO), Take by mouth daily., Disp: , Rfl:       Social History     Socioeconomic History   • Marital status:    Tobacco Use   • Smoking status: Former Smoker     Packs/day: 2.00     Years: 40.00     Pack years: 80.00     Types: Cigarettes     Quit date: 10/14/2009     Years since quittin.5   • Smokeless tobacco: Never Used   • Tobacco comment: caffeine use : 2 cups daily.   Vaping Use   • Vaping Use: Never used   Substance and Sexual Activity   • Alcohol use: No   • Drug use: No         Review of Systems   Constitutional: Positive for malaise/fatigue.   HENT: Negative.         Epistaxis   Eyes: Negative.    Cardiovascular: Positive for dyspnea on exertion.   Respiratory: Negative.    Endocrine: Negative.    Skin: Negative.    Musculoskeletal: Negative.    Gastrointestinal: Negative.    Neurological: Negative.    Psychiatric/Behavioral: Negative.        Procedures      ECG 12 Lead    Date/Time: 2022 12:53 PM  Performed by: Chintan Sinha MD  Authorized by: Chintan Sinha MD   Comparison: compared with  "previous ECG from 3/29/2022  Similar to previous ECG  Rhythm: sinus rhythm  Rate: normal  Conduction: conduction normal  ST Segments: ST segments normal  T Waves: T waves normal  QRS axis: normal                  Objective:    /74 (BP Location: Left arm, Patient Position: Sitting, Cuff Size: Large Adult)   Pulse 67   Ht 172.7 cm (68\")   Wt 101 kg (221 lb 9.6 oz)   SpO2 98%   BMI 33.69 kg/m²         Vitals reviewed.   Constitutional:       Appearance: Well-developed.   Eyes:      Pupils: Pupils are equal, round, and reactive to light.   HENT:      Head: Normocephalic.   Neck:      Thyroid: No thyromegaly.      Vascular: No carotid bruit or JVD.   Pulmonary:      Effort: Pulmonary effort is normal.      Breath sounds: Normal breath sounds.   Cardiovascular:      Normal rate. Regular rhythm. Normal S1. Normal S2.      Murmurs: There is no murmur.      No gallop. No click. No rub.   Pulses:     Intact distal pulses.   Edema:     Peripheral edema absent.   Abdominal:      General: Bowel sounds are normal.      Palpations: Abdomen is soft.   Musculoskeletal:      Cervical back: Normal range of motion. Skin:     General: Skin is warm and dry.      Findings: No erythema.   Neurological:      Mental Status: Alert and oriented to person, place, and time.             Assessment & Plan:       Diagnosis Plan   1. Atrial fibrillation status post cardioversion (HCC)     2. Chronic diastolic congestive heart failure (HCC)     3. Essential hypertension     4. Ischemic heart disease     5. Epistaxis           1.  Atrial fibrillation: Status post cardioversion.  She remains on apixaban and amiodarone.  2.  Epistaxis: If she has any further events we will have her see ENT  3.  Hypertension: Controlled  4.  Coronary artery disease: Status post previous infarct.  No angina pectoris.  Intervention to both LAD and RCA  5.  Chronic diastolic heart failure: Compensated  "

## 2022-05-07 ENCOUNTER — TELEPHONE (OUTPATIENT)
Dept: CARDIOLOGY | Facility: CLINIC | Age: 82
End: 2022-05-07

## 2022-05-07 NOTE — TELEPHONE ENCOUNTER
Dr. Sinha/ Ebony,    Mellisa's daughter called because Mellisa has had two episodes of epistaxis over the weekend.  The first episode was brief (less than five minutes), the second episode persisted for about twenty minutes.  Mellisa wanted to hold her Eliquis- but her daughter wanted call and confirm before.    I spoke to Dr. Torres who recommended holding her Eliquis with the continued nose bleeds for the remainder of the weekend and to call Monday morning to get a plan in place.    I updated the patient and the daughter, all were in agreement.    Thanks    Brit

## 2022-05-09 ENCOUNTER — TELEPHONE (OUTPATIENT)
Dept: CARDIOLOGY | Facility: CLINIC | Age: 82
End: 2022-05-09

## 2022-05-09 DIAGNOSIS — R04.0 EPISTAXIS: Primary | ICD-10-CM

## 2022-05-16 ENCOUNTER — TELEPHONE (OUTPATIENT)
Dept: CARDIOLOGY | Facility: CLINIC | Age: 82
End: 2022-05-16

## 2022-05-16 NOTE — TELEPHONE ENCOUNTER
Ok.  We'll get the form faxed over to them.  Thanks   The patient phoned today about her nosebleeds, Eliquis and referral to ENT. She was advised of the previous telephone note of 5/9/22 that she was to be seen in referral to Dr. Herndon and to hold Eliquis.   She had not held her Eliquis or made her appointment to the ENT.  She was further advise a referral was on file to ENT.  She did obtain her appointment with Dr. Herndon and is now holding the Eliquis until after the appointment with Dr. Herndon on Thursday.   She will advise us on Dr. Herndon's recommendations following that visit.   Thank you

## 2022-05-25 RX ORDER — DILTIAZEM HYDROCHLORIDE 120 MG/1
CAPSULE, COATED, EXTENDED RELEASE ORAL
Qty: 90 CAPSULE | Refills: 1 | Status: SHIPPED | OUTPATIENT
Start: 2022-05-25 | End: 2022-08-24 | Stop reason: SDUPTHER

## 2022-05-26 ENCOUNTER — TELEPHONE (OUTPATIENT)
Dept: CARDIOLOGY | Facility: CLINIC | Age: 82
End: 2022-05-26

## 2022-05-26 NOTE — TELEPHONE ENCOUNTER
Pt called stating  that she was in to see the ENT yesterday. They did a cauterize on her nose. They explain to her that she should go back on her Eliquis but she would like to ask a cardiologist. She also mention that she is getting a tooth pulled 06/01/22.     Please give her your recommendations.     She can be reached at [phone]    Thanks

## 2022-05-27 ENCOUNTER — OFFICE VISIT (OUTPATIENT)
Dept: SLEEP MEDICINE | Facility: HOSPITAL | Age: 82
End: 2022-05-27

## 2022-05-27 VITALS — OXYGEN SATURATION: 98 % | HEART RATE: 61 BPM | HEIGHT: 68 IN | WEIGHT: 222 LBS | BODY MASS INDEX: 33.65 KG/M2

## 2022-05-27 DIAGNOSIS — G47.30 SLEEP-DISORDERED BREATHING: Primary | ICD-10-CM

## 2022-05-27 DIAGNOSIS — G47.10 HYPERSOMNOLENT: ICD-10-CM

## 2022-05-27 PROCEDURE — 99204 OFFICE O/P NEW MOD 45 MIN: CPT | Performed by: INTERNAL MEDICINE

## 2022-05-27 PROCEDURE — G0463 HOSPITAL OUTPT CLINIC VISIT: HCPCS

## 2022-06-12 PROBLEM — G47.30 SLEEP-DISORDERED BREATHING: Status: ACTIVE | Noted: 2022-06-12

## 2022-06-14 DIAGNOSIS — I48.92 ATRIAL FLUTTER WITH RAPID VENTRICULAR RESPONSE: Primary | ICD-10-CM

## 2022-07-14 ENCOUNTER — HOSPITAL ENCOUNTER (OUTPATIENT)
Dept: SLEEP MEDICINE | Facility: HOSPITAL | Age: 82
Discharge: HOME | End: 2022-07-14
Admitting: INTERNAL MEDICINE

## 2022-07-14 DIAGNOSIS — G47.30 SLEEP-DISORDERED BREATHING: ICD-10-CM

## 2022-07-14 PROCEDURE — 95806 SLEEP STUDY UNATT&RESP EFFT: CPT | Performed by: INTERNAL MEDICINE

## 2022-07-14 PROCEDURE — 95806 SLEEP STUDY UNATT&RESP EFFT: CPT

## 2022-08-03 ENCOUNTER — OFFICE VISIT (OUTPATIENT)
Dept: SLEEP MEDICINE | Facility: HOSPITAL | Age: 82
End: 2022-08-03

## 2022-08-03 VITALS — BODY MASS INDEX: 33.8 KG/M2 | HEART RATE: 62 BPM | WEIGHT: 223 LBS | OXYGEN SATURATION: 98 % | HEIGHT: 68 IN

## 2022-08-03 DIAGNOSIS — G47.14 HYPERSOMNIA DUE TO MEDICAL CONDITION: ICD-10-CM

## 2022-08-03 DIAGNOSIS — G47.33 OSA (OBSTRUCTIVE SLEEP APNEA): Primary | ICD-10-CM

## 2022-08-03 PROCEDURE — G0463 HOSPITAL OUTPT CLINIC VISIT: HCPCS

## 2022-08-03 PROCEDURE — 99213 OFFICE O/P EST LOW 20 MIN: CPT | Performed by: INTERNAL MEDICINE

## 2022-08-03 NOTE — PROGRESS NOTES
"Follow Up [facility] Disorders [facility] Note     Chief Complaint:  AIDA     Primary Care Physician: Marcial Kwon MD    Interval History:   The patient is a 81 y.o. female  who I last saw 2022 and that note was reviewed.  Subsequently, home sleep study performed 2022.  Mild AIDA with AHI 10 events per hour noted.  Low oxygen saturation 86% and no sleep-related hypoxia noted.  Additionally, the patient mainly had a positional component.    The patient reports she is unchanged because no therapy has been initiated yet.  The patient goes to bed 11:30 PM and awakens between 6:30 AM and 7 AM.  She will use the restroom during that time.    Review of Systems:    A complete review of systems was done and all were negative with the exception of some shortness of breath, some ear issues, and abdominal bloating with lots of gas    Social History:    Social History     Socioeconomic History   • Marital status:    Tobacco Use   • Smoking status: Former Smoker     Packs/day: 2.00     Years: 40.00     Pack years: 80.00     Types: Cigarettes     Quit date: 10/14/2009     Years since quittin.8   • Smokeless tobacco: Never Used   • Tobacco comment: caffeine use : 2 cups daily.   Vaping Use   • Vaping Use: Never used   Substance and Sexual Activity   • Alcohol use: No   • Drug use: No       Allergies:  Diclofenac sodium, Celecoxib, Ibuprofen, Niacin, and Nsaids     Medication Review: Her list was reviewed.      Vital Signs:    Vitals:    22 1024   Pulse: 62   SpO2: 98%   Weight: 101 kg (223 lb)   Height: 172.7 cm (68\")     Body mass index is 33.91 kg/m².    Physical Exam:    Constitutional:  Well developed 81 y.o. female that appears in no apparent distress.  Awake & oriented times 3.  Normal mood with normal recent and remote memory and normal judgement.  Eyes:  Conjunctivae normal.  Oropharynx: Previously, moist mucous membranes without exudate and a large tongue and normal uvula and patent posterior " pharyngeal opening and class II Mallampati airway, patient is wearing a facemask.    Self-administered Jones Sleepiness Scale test results: 6  0-5 Lower normal daytime sleepiness  6-10 Higher normal daytime sleepiness  11-12 Mild, 13-15 Moderate, & 16-24 Severe excessive daytime sleepiness    Impression:   Mild obstructive sleep apnea by home sleep study 7/14/2022.  The patient has complaints of hypersomnolence with a normal Jones Sleepiness Scale.  Comorbidities include atrial fibrillation.    Plan:  Good sleep hygiene measures should be maintained.  Weight loss would be beneficial in this patient who is obese by BMI.      After reviewing all with the patient, I would recommend and she is agreeable to proceed with auto titrating CPAP between 7 and 16 cm water pressure.  An appropriate interface should be selected.  I answered all of her questions.  Once set up performed, I will see the patient in follow-up 4 to 6 weeks later to review downloads to assure compliance and efficacy.    I answered all of the patient's questions.  The patient will call for any problems.      Marcial Jaime MD  Sleep Medicine  08/03/22  10:51 EDT

## 2022-08-07 PROBLEM — G47.33 OSA (OBSTRUCTIVE SLEEP APNEA): Status: ACTIVE | Noted: 2022-08-07

## 2022-08-07 PROBLEM — G47.14 HYPERSOMNIA DUE TO MEDICAL CONDITION: Status: ACTIVE | Noted: 2022-03-29

## 2022-08-17 ENCOUNTER — TELEPHONE (OUTPATIENT)
Dept: CARDIOLOGY | Facility: CLINIC | Age: 82
End: 2022-08-17

## 2022-08-17 NOTE — TELEPHONE ENCOUNTER
Ebony     Mrs. Sheldon called stating she needs a refill on her Amiodarone.     However she wasn't sure if she will be taking any longer.  Please advise if she should continue and if so, can you please refill?    Thank You,    Nadege

## 2022-08-17 NOTE — TELEPHONE ENCOUNTER
It looks like she has an appointment with Clover Foster on 08/24/22.  At that time, they will decide if she needs to continue amiodarone. Let's have her not take it over the next week and see how she does without it, prior to her appointment.

## 2022-08-17 NOTE — TELEPHONE ENCOUNTER
Advised Wilbert Monalisa to hold the Amiodarone until she sees Ebony Foster.  She verbalized understanding.

## 2022-08-22 NOTE — TELEPHONE ENCOUNTER
Please advise filling Isosorbide.  Please advise filling, you have not seen patient.    LOV   -   5/4/2022   RL  Next   -   8/24/2022 Ebony Foster  Last labs   -   3/14/2022  VEE PAPPAS

## 2022-08-23 RX ORDER — ISOSORBIDE MONONITRATE 30 MG/1
TABLET, EXTENDED RELEASE ORAL
Qty: 90 TABLET | Refills: 0 | Status: SHIPPED | OUTPATIENT
Start: 2022-08-23 | End: 2022-12-01 | Stop reason: SDUPTHER

## 2022-08-24 ENCOUNTER — OFFICE VISIT (OUTPATIENT)
Dept: CARDIOLOGY | Facility: CLINIC | Age: 82
End: 2022-08-24

## 2022-08-24 VITALS
HEART RATE: 52 BPM | HEIGHT: 68 IN | DIASTOLIC BLOOD PRESSURE: 74 MMHG | SYSTOLIC BLOOD PRESSURE: 124 MMHG | WEIGHT: 222.8 LBS | OXYGEN SATURATION: 98 % | BODY MASS INDEX: 33.77 KG/M2

## 2022-08-24 DIAGNOSIS — E78.5 HYPERLIPIDEMIA LDL GOAL <70: ICD-10-CM

## 2022-08-24 DIAGNOSIS — I50.32 CHRONIC DIASTOLIC CONGESTIVE HEART FAILURE: Primary | ICD-10-CM

## 2022-08-24 DIAGNOSIS — I10 ESSENTIAL HYPERTENSION: ICD-10-CM

## 2022-08-24 DIAGNOSIS — I48.91 ATRIAL FIBRILLATION STATUS POST CARDIOVERSION: ICD-10-CM

## 2022-08-24 DIAGNOSIS — G47.33 OSA (OBSTRUCTIVE SLEEP APNEA): ICD-10-CM

## 2022-08-24 PROBLEM — I25.10 CORONARY ARTERY DISEASE INVOLVING NATIVE CORONARY ARTERY OF NATIVE HEART WITHOUT ANGINA PECTORIS: Status: ACTIVE | Noted: 2022-08-24

## 2022-08-24 PROCEDURE — 99214 OFFICE O/P EST MOD 30 MIN: CPT | Performed by: NURSE PRACTITIONER

## 2022-08-24 PROCEDURE — 93000 ELECTROCARDIOGRAM COMPLETE: CPT | Performed by: NURSE PRACTITIONER

## 2022-08-24 RX ORDER — METOPROLOL TARTRATE 50 MG/1
50 TABLET, FILM COATED ORAL 2 TIMES DAILY
Qty: 180 TABLET | Refills: 3 | Status: SHIPPED | OUTPATIENT
Start: 2022-08-24

## 2022-08-24 RX ORDER — DILTIAZEM HYDROCHLORIDE 120 MG/1
120 CAPSULE, COATED, EXTENDED RELEASE ORAL DAILY
Qty: 90 CAPSULE | Refills: 3 | Status: SHIPPED | OUTPATIENT
Start: 2022-08-24 | End: 2023-02-27

## 2022-08-24 RX ORDER — ATORVASTATIN CALCIUM 20 MG/1
20 TABLET, FILM COATED ORAL DAILY
Qty: 90 TABLET | Refills: 3 | Status: SHIPPED | OUTPATIENT
Start: 2022-08-24

## 2022-08-24 NOTE — PROGRESS NOTES
Date of Office Visit: 2022  Encounter Provider: MIKE Chen  Place of Service: [facility] CARDIOLOGY  Patient Name: Mellisa Sheldon  :1940    Chief Complaint   Patient presents with   • Hypersomnolent   • Atrial flutter with rapid ventricular response    • Follow-up   :     HPI: Mellisa Sheldon is a 81 y.o. female who is a patient of Dr. Sinha.  She presents today for 3-month office follow-up.  She has a history of coronary artery disease, hypertension, vertigo and atrial fibrillation.  She experienced an anterior wall myocardial infarction in  requiring intervention to the LAD.  She had subsequent intervention in  to the right coronary artery.  Her ejection fraction was mildly reduced to 45 to 50%.      She was also seen recently for atrial fibrillation/flutter.  She underwent successful external cardioversion on 3/14/2022 and was continued on anticoagulation with Eliquis as well as amiodarone.  24-hour Holter monitor 2022 showed normal sinus rhythm, rare PACs.  Atrial fibrillation was not detected and there were no pauses or high degree AV block detected as well.  She underwent home sleep study in July and was found to have mild obstructive sleep apnea.  She is agreeable to proceed with CPAP and follow-up with sleep medicine in 4 to 6 weeks.    She has maintained sinus rhythm.  She has been off of amiodarone for at least 1 week.  Selected has no complaints of chest pain, pressure, palpitations or shortness of air.  He is feeling well.  Blood pressures well controlled.  She has some fatigue.  EKG sinus rhythm with a rate of 52 and first-degree AV block.  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    Previous testing and notes have been reviewed by me.   Past Medical History:   Diagnosis Date   • GERD (gastroesophageal reflux disease)    • Hyperlipidemia    • Ischemic cardiomyopathy    • Myocardial infarction (HCC)    • AIDA (obstructive sleep apnea)  2022    Home sleep study.  Weight 222 pounds.  Mild AIDA with AHI 10 events per hour.  No sleep-related hypoxia.       Past Surgical History:   Procedure Laterality Date   • CORONARY ANGIOPLASTY      angioplasties of the LAD and the right coronary artery both with stent placements       Social History     Socioeconomic History   • Marital status:    Tobacco Use   • Smoking status: Former Smoker     Packs/day: 2.00     Years: 40.00     Pack years: 80.00     Types: Cigarettes     Quit date: 10/14/2009     Years since quittin.8   • Smokeless tobacco: Never Used   • Tobacco comment: caffeine use : 2 cups daily.   Vaping Use   • Vaping Use: Never used   Substance and Sexual Activity   • Alcohol use: No   • Drug use: No       History reviewed. No pertinent family history.    Review of Systems   Constitutional: Negative.   HENT: Negative.    Eyes: Negative.    Cardiovascular: Negative.    Respiratory: Negative.    Endocrine: Negative.    Hematologic/Lymphatic: Negative.    Skin: Negative.    Musculoskeletal: Negative.    Gastrointestinal: Negative.    Genitourinary: Negative.    Neurological: Negative.    Psychiatric/Behavioral: Negative.    Allergic/Immunologic: Negative.        Allergies   Allergen Reactions   • Diclofenac Sodium Unknown - Low Severity   • Celecoxib Itching   • Ibuprofen Itching   • Niacin Other (See Comments)     unknown   • Nsaids Itching         Current Outpatient Medications:   •  atorvastatin (LIPITOR) 20 MG tablet, Take 1 tablet by mouth Daily., Disp: , Rfl:   •  dilTIAZem CD (CARDIZEM CD) 120 MG 24 hr capsule, TAKE ONE CAPSULE BY MOUTH DAILY, Disp: 90 capsule, Rfl: 1  •  isosorbide mononitrate (IMDUR) 30 MG 24 hr tablet, TAKE ONE TABLET BY MOUTH DAILY, Disp: 90 tablet, Rfl: 0  •  metoprolol tartrate (LOPRESSOR) 50 MG tablet, Take 1.5 tablets by mouth 2 (Two) Times a Day., Disp: 180 tablet, Rfl: 3  •  Multiple Vitamins-Minerals (PRESERVISION AREDS PO), Take  by mouth., Disp: , Rfl:  "  •  nitroglycerin (NITROSTAT) 0.4 MG SL tablet, Place under the tongue., Disp: , Rfl:   •  Omega-3 Fatty Acids (FISH OIL) 1000 MG capsule capsule, Take 2,000 mg by mouth Daily With Breakfast., Disp: , Rfl:   •  pantoprazole (PROTONIX) 40 MG EC tablet, TAKE ONE TABLET BY MOUTH DAILY, Disp: , Rfl:   •  Pyridoxine HCl (VITAMIN B-6 PO), Take by mouth daily., Disp: , Rfl:       Objective:     Vitals:    08/24/22 1153   BP: 124/74   BP Location: Left arm   Patient Position: Sitting   Pulse: 52   SpO2: 98%   Weight: 101 kg (222 lb 12.8 oz)   Height: 172.7 cm (68\")     Body mass index is 33.88 kg/m².     2D Echocardiogram 11/12/2021:  LVEF 64%, mild LVH, grade 1 diastolic dysfunction  Normal RV size and systolic function  Normal LA size, normal RA size  AV abnormal in structure, trace AI, no AAS, AV leaflets moderately calcified  Structurally normal MV, no significant MS, trace MR  Trace TR  No dilation of aortic root, normal IVC size with normal IVC inspiratory collapse of greater than 50% noted    Lexiscan stress test 5/2/2014:  Normal 2-day Lexiscan stress test without evidence of ischemia or prior infarction.  LV EF 64%    2D Echocardiogram 5/2/2014:  Mild LVH, EF 65%, normal diastolic function  LA normal size  Normal RV size and systolic function  Normal RA size  No AI, no AS  No M RRR, no MS  Trace TR      PHYSICAL EXAM:    Constitutional:       Appearance: Healthy appearance. Not in distress.   Neck:      Vascular: No JVR. JVD normal.   Pulmonary:      Effort: Pulmonary effort is normal.      Breath sounds: Normal breath sounds. No wheezing. No rhonchi. No rales.   Chest:      Chest wall: Not tender to palpatation.   Cardiovascular:      PMI at left midclavicular line. Normal rate. Regular rhythm. Normal S1. Normal S2.      Murmurs: There is no murmur.      No gallop. No click. No rub.      Comments: Chronic generalized bilateral lower extremity edema  Pulses:     Intact distal pulses.   Edema:     Peripheral edema " absent.   Abdominal:      General: Bowel sounds are normal.      Palpations: Abdomen is soft.      Tenderness: There is no abdominal tenderness.   Musculoskeletal: Normal range of motion.         General: No tenderness. Skin:     General: Skin is warm and dry.   Neurological:      General: No focal deficit present.      Mental Status: Alert and oriented to person, place and time.           ECG 12 Lead    Date/Time: 8/24/2022 12:32 PM  Performed by: Elicia Lemos APRN  Authorized by: Elicia Lemos APRN   Comparison: compared with previous ECG from 5/4/2022  Rhythm: sinus rhythm  BPM: 52  Conduction: 1st degree AV block  T Waves: T waves normal                Assessment:       Diagnosis Plan   1. Chronic diastolic congestive heart failure (HCC)     2. Atrial fibrillation status post cardioversion (HCC)     3. AIDA (obstructive sleep apnea)     4. Hyperlipidemia LDL goal <70     5. Essential hypertension       No orders of the defined types were placed in this encounter.         Plan:       1.  Atrial fibrillation status post cardioversion: Has remained in sinus rhythm.  We will stop amiodarone.  Eliquis has been stopped.  Decrease metoprolol to 50 mg twice daily.  Continue diltiazem.  2.  Hypertension: Well-controlled.  Patient will monitor her blood pressure in the morning and night for the next week with the decrease in metoprolol.  We will continue to make changes as tolerated  3.  Coronary artery disease: Status post PCI/DRE to LAD and RCA.  No angina.  EKG with no changes  4.  Chronic diastolic heart failure: Euvolemic  5.  Hyperlipidemia: Recent lipid panel in target range except LDL 83.  Followed by PCP.  On statin therapy.  6.  Obstructive sleep apnea: Mild.  Waiting for CPAP.  I would likely wait to make further changes to beta-blocker and diltiazem until patient begins wearing CPAP, as AIDA may have been underlying cause for atrial fibrillation.     Ms. Sheldon will follow up with  in 6  months.  She will call sooner for any questions or concerns         Your medication list          Accurate as of August 24, 2022 12:14 PM. If you have any questions, ask your nurse or doctor.            CONTINUE taking these medications      Instructions Last Dose Given Next Dose Due   atorvastatin 20 MG tablet  Commonly known as: LIPITOR      Take 1 tablet by mouth Daily.       dilTIAZem  MG 24 hr capsule  Commonly known as: CARDIZEM CD      TAKE ONE CAPSULE BY MOUTH DAILY       fish oil 1000 MG capsule capsule      Take 2,000 mg by mouth Daily With Breakfast.       isosorbide mononitrate 30 MG 24 hr tablet  Commonly known as: IMDUR      TAKE ONE TABLET BY MOUTH DAILY       metoprolol tartrate 50 MG tablet  Commonly known as: LOPRESSOR      Take 1.5 tablets by mouth 2 (Two) Times a Day.       multivitamin with minerals tablet tablet      Take  by mouth.       nitroglycerin 0.4 MG SL tablet  Commonly known as: NITROSTAT      Place under the tongue.       pantoprazole 40 MG EC tablet  Commonly known as: PROTONIX      TAKE ONE TABLET BY MOUTH DAILY       VITAMIN B-6 PO      Take by mouth daily.          STOP taking these medications    amiodarone 200 MG tablet  Commonly known as: PACERONE  Stopped by: MIKE Chen        apixaban 5 MG tablet tablet  Commonly known as: ELIQUIS  Stopped by: MIKE Chen        furosemide 40 MG tablet  Commonly known as: LASIX  Stopped by: MIKE Chen        potassium chloride 20 MEQ CR tablet  Commonly known as: KLOR-CON  Stopped by: MIKE Chen                 As always, it has been a pleasure to participate in your patient's care.      Sincerely,       MIKE Levine

## 2022-12-01 RX ORDER — ISOSORBIDE MONONITRATE 30 MG/1
30 TABLET, EXTENDED RELEASE ORAL DAILY
Qty: 90 TABLET | Refills: 1 | Status: SHIPPED | OUTPATIENT
Start: 2022-12-01

## 2022-12-01 NOTE — TELEPHONE ENCOUNTER
Pt's daughter/Veronica ([phone]) called for refills on Isosorbide, to be sent to [facility]  12/1/22

## 2023-02-27 ENCOUNTER — OFFICE VISIT (OUTPATIENT)
Dept: CARDIOLOGY | Facility: CLINIC | Age: 83
End: 2023-02-27
Payer: MEDICARE

## 2023-02-27 VITALS
BODY MASS INDEX: 33.8 KG/M2 | SYSTOLIC BLOOD PRESSURE: 134 MMHG | WEIGHT: 223 LBS | DIASTOLIC BLOOD PRESSURE: 72 MMHG | HEIGHT: 68 IN | HEART RATE: 70 BPM

## 2023-02-27 DIAGNOSIS — I48.0 PAROXYSMAL ATRIAL FIBRILLATION: Primary | ICD-10-CM

## 2023-02-27 PROCEDURE — 99213 OFFICE O/P EST LOW 20 MIN: CPT | Performed by: INTERNAL MEDICINE

## 2023-02-27 RX ORDER — ASPIRIN 81 MG/1
81 TABLET ORAL DAILY
COMMUNITY

## 2023-02-27 RX ORDER — LEVOTHYROXINE SODIUM 0.03 MG/1
25 TABLET ORAL DAILY
COMMUNITY
Start: 2022-11-03 | End: 2023-11-03

## 2023-02-27 NOTE — PROGRESS NOTES
Subjective:     Encounter Date:2023      Patient ID: Mellisa Sheldon is a 82 y.o. female.    Chief Complaint: CAD, CHF, atrial fibrillation  HPI:   This is an 82-year-old woman who I am meeting for the first time today.  She has a history of CAD with an anterior wall MI in  with PCI of the LAD followed by PCI of the RCA in .  Her she has ischemic cardiomyopathy with recovered EF.  She has a history of atrial fibrillation status post cardioversion.   Apparently she had severe epistaxis with Eliquis and as a result is only on ASA 81. Post cardioversion she has had no further AF even off of amiodarone. She has not had angina since her MI in . She is quite active in her daily life but doesn't do any regular exercise. She lives with her daughter.     The following portions of the patient's history were reviewed and updated as appropriate: allergies, current medications, past family history, past medical history, past social history, past surgical history and problem list.     REVIEW OF SYSTEMS:   All systems reviewed.  Pertinent positives identified in HPI.  All other systems are negative.    Past Medical History:   Diagnosis Date   • GERD (gastroesophageal reflux disease)    • Hyperlipidemia    • Ischemic cardiomyopathy    • Myocardial infarction (HCC)    • AIDA (obstructive sleep apnea) 2022    Home sleep study.  Weight 222 pounds.  Mild AIDA with AHI 10 events per hour.  No sleep-related hypoxia.       History reviewed. No pertinent family history.    Social History     Socioeconomic History   • Marital status:    Tobacco Use   • Smoking status: Former     Packs/day: 2.00     Years: 40.00     Pack years: 80.00     Types: Cigarettes     Quit date: 10/14/2009     Years since quittin.3   • Smokeless tobacco: Never   • Tobacco comments:     caffeine use : 2 cups daily.   Vaping Use   • Vaping Use: Never used   Substance and Sexual Activity   • Alcohol use: No   • Drug use: No   •  Sexual activity: Not Currently       Allergies   Allergen Reactions   • Diclofenac Sodium Unknown - Low Severity   • Celecoxib Itching   • Ibuprofen Itching   • Niacin Other (See Comments)     unknown   • Nsaids Itching       Past Surgical History:   Procedure Laterality Date   • CORONARY ANGIOPLASTY      angioplasties of the LAD and the right coronary artery both with stent placements       Procedures       Objective:         PHYSICAL EXAM:  GEN: VSS, no distress,   Eyes: normal sclera, normal lids and lashes  HENT: moist mucus membranes,   Respiratory: CTAB, no rales or wheezes  CV: RRR, no murmurs, , +2 DP and 2+ carotid pulses b/l  GI: NABS, soft,  Nontender, nondistended  MSK: no edema, no scoliosis or kyphosis  Skin: no rash, warm, dry  Heme/Lymph: no bruising or bleeding  Psych: organized thought, normal behavior and affect  Neuro: Cranial nerves grossly intact, Alert and Oriented x 3.         Assessment:          Diagnosis Plan   1. Paroxysmal atrial fibrillation (HCC)               Plan:       1. AF: paroxysmal AF, symptomatic with palpitaitons and tachycardia. No further AF since cardioversion. Now off amiodarone for 6 months without recurrence. No anticoagulation due to severe epistaxis on Eliquis.   Stop diltiazem. Continue Metoprolol.   2. CAD h/o MI with PCI fo the LAD and RCA in 2011, no further angina  3. Ischemic CM: EF recovered. No CHF symptoms.   4. HLD AT [facility].      Dr. Kwon, thank you very much for referring this kind patient to me. Please call me with any questions or concerns. I will see the patient again in the office in 6 months.          Isa Goldstein MD  02/27/23  [facility]    Outpatient Encounter Medications as of 2/27/2023   Medication Sig Dispense Refill   • aspirin 81 MG EC tablet Take 81 mg by mouth Daily.     • atorvastatin (LIPITOR) 20 MG tablet Take 1 tablet by mouth Daily. 90 tablet 3   • dilTIAZem CD (CARDIZEM CD) 120 MG 24 hr capsule Take 1 capsule by mouth  Daily. 90 capsule 3   • isosorbide mononitrate (IMDUR) 30 MG 24 hr tablet Take 1 tablet by mouth Daily. 90 tablet 1   • levothyroxine (SYNTHROID, LEVOTHROID) 25 MCG tablet Take 25 mcg by mouth Daily.     • metoprolol tartrate (LOPRESSOR) 50 MG tablet Take 1 tablet by mouth 2 (Two) Times a Day. 180 tablet 3   • Multiple Vitamins-Minerals (PRESERVISION AREDS PO) Take  by mouth.     • Omega-3 Fatty Acids (FISH OIL) 1000 MG capsule capsule Take 2,000 mg by mouth Daily With Breakfast.     • pantoprazole (PROTONIX) 40 MG EC tablet TAKE ONE TABLET BY MOUTH DAILY     • Pyridoxine HCl (VITAMIN B-6 PO) Take by mouth daily.     • nitroglycerin (NITROSTAT) 0.4 MG SL tablet Place under the tongue.       No facility-administered encounter medications on file as of 2/27/2023.

## 2023-05-17 RX ORDER — ISOSORBIDE MONONITRATE 30 MG/1
TABLET, EXTENDED RELEASE ORAL
Qty: 90 TABLET | Refills: 1 | Status: SHIPPED | OUTPATIENT
Start: 2023-05-17

## 2023-07-31 RX ORDER — ATORVASTATIN CALCIUM 20 MG/1
TABLET, FILM COATED ORAL
Qty: 90 TABLET | Refills: 1 | Status: SHIPPED | OUTPATIENT
Start: 2023-07-31

## 2023-07-31 RX ORDER — METOPROLOL TARTRATE 50 MG/1
TABLET, FILM COATED ORAL
Qty: 180 TABLET | Refills: 1 | Status: SHIPPED | OUTPATIENT
Start: 2023-07-31

## 2023-09-13 ENCOUNTER — OFFICE VISIT (OUTPATIENT)
Dept: CARDIOLOGY | Facility: CLINIC | Age: 83
End: 2023-09-13
Payer: MEDICARE

## 2023-09-13 VITALS
BODY MASS INDEX: 34.53 KG/M2 | HEIGHT: 68 IN | DIASTOLIC BLOOD PRESSURE: 68 MMHG | HEART RATE: 64 BPM | WEIGHT: 227.8 LBS | SYSTOLIC BLOOD PRESSURE: 104 MMHG

## 2023-09-13 DIAGNOSIS — I48.0 PAROXYSMAL ATRIAL FIBRILLATION: ICD-10-CM

## 2023-09-13 DIAGNOSIS — E78.5 HYPERLIPIDEMIA LDL GOAL <70: ICD-10-CM

## 2023-09-13 DIAGNOSIS — I50.32 CHRONIC DIASTOLIC CONGESTIVE HEART FAILURE: ICD-10-CM

## 2023-09-13 DIAGNOSIS — I25.10 CORONARY ARTERY DISEASE INVOLVING NATIVE CORONARY ARTERY OF NATIVE HEART WITHOUT ANGINA PECTORIS: Primary | ICD-10-CM

## 2023-09-13 DIAGNOSIS — I10 ESSENTIAL HYPERTENSION: ICD-10-CM

## 2023-09-13 NOTE — PROGRESS NOTES
Date of Office Visit: 2023  Encounter Provider: MIKE Chen  Place of Service: [facility] CARDIOLOGY  Patient Name: Mellisa Sheldon  :1940    No chief complaint on file.  : 6 month follow up    HPI: Mellisa Sheldon is a 82 y.o. female who previously followed with Dr. Sinha and now follows with Dr. Goldstein.  She presents today for 6-month office follow-up.  She has a history of coronary artery disease, hypertension, vertigo and atrial fibrillation.  She experienced an anterior wall myocardial infarction in  requiring intervention to the LAD.  She had subsequent intervention in  to the right coronary artery.  Her ejection fraction was mildly reduced to 45 to 50%.      She was also seen for atrial fibrillation/flutter in .  She underwent successful external cardioversion on 3/14/2022 and was continued on anticoagulation with Eliquis, as well as, amiodarone.  24-hour Holter monitor 2022 showed normal sinus rhythm, rare PACs.  She has had no further episodes of atrial fibrillation since her cardioversion even off amiodarone.     She underwent home sleep study in 2022 and was found to have mild obstructive sleep apnea.  She has not worn her CPAP in about 4 months.  She does not notice much of a difference.  We did discuss the effects of untreated sleep apnea on atrial fibrillation and patient agrees to start wearing her CPAP again.  She remains pretty active.  She lives with her daughter who recently had a heart attack with stents placed.  Ms. Sheldon has some lightheadedness at times.  Blood pressure is 104/68 at this office visit.  Her normal blood pressure usually appears to be systolically 120s to 130s.  EKG is normal.  She appears euvolemic.    Previous testing and notes have been reviewed by me.   Past Medical History:   Diagnosis Date    GERD (gastroesophageal reflux disease)     Hyperlipidemia     Ischemic cardiomyopathy      Myocardial infarction     AIDA (obstructive sleep apnea) 2022    Home sleep study.  Weight 222 pounds.  Mild AIDA with AHI 10 events per hour.  No sleep-related hypoxia.       Past Surgical History:   Procedure Laterality Date    CORONARY ANGIOPLASTY      angioplasties of the LAD and the right coronary artery both with stent placements       Social History     Socioeconomic History    Marital status:    Tobacco Use    Smoking status: Former     Packs/day: 2.00     Years: 40.00     Pack years: 80.00     Types: Cigarettes     Quit date: 10/14/2009     Years since quittin.9    Smokeless tobacco: Never    Tobacco comments:     caffeine use : 2 cups daily.   Vaping Use    Vaping Use: Never used   Substance and Sexual Activity    Alcohol use: No    Drug use: No    Sexual activity: Not Currently       No family history on file.    Review of Systems   Constitutional: Negative.   HENT: Negative.     Eyes: Negative.    Cardiovascular: Negative.    Respiratory: Negative.     Endocrine: Negative.    Hematologic/Lymphatic: Negative.    Skin: Negative.    Musculoskeletal: Negative.    Gastrointestinal: Negative.    Genitourinary: Negative.    Neurological: Negative.    Psychiatric/Behavioral: Negative.     Allergic/Immunologic: Negative.      Allergies   Allergen Reactions    Diclofenac Sodium Unknown - Low Severity    Celecoxib Itching    Ibuprofen Itching    Niacin Other (See Comments)     unknown    Nsaids Itching         Current Outpatient Medications:     aspirin 81 MG EC tablet, Take 1 tablet by mouth Daily., Disp: , Rfl:     atorvastatin (LIPITOR) 20 MG tablet, TAKE ONE TABLET BY MOUTH DAILY, Disp: 90 tablet, Rfl: 1    isosorbide mononitrate (IMDUR) 30 MG 24 hr tablet, TAKE ONE TABLET BY MOUTH DAILY, Disp: 90 tablet, Rfl: 1    levothyroxine (SYNTHROID, LEVOTHROID) 25 MCG tablet, Take 1 tablet by mouth Daily., Disp: , Rfl:     metoprolol tartrate (LOPRESSOR) 25 MG tablet, Take 1 tablet by mouth 2 (Two) Times  "a Day., Disp: 180 tablet, Rfl: 3    Multiple Vitamins-Minerals (PRESERVISION AREDS PO), Take  by mouth., Disp: , Rfl:     nitroglycerin (NITROSTAT) 0.4 MG SL tablet, Place under the tongue., Disp: , Rfl:     Omega-3 Fatty Acids (FISH OIL) 1000 MG capsule capsule, Take 2 capsules by mouth Daily With Breakfast., Disp: , Rfl:     pantoprazole (PROTONIX) 40 MG EC tablet, TAKE ONE TABLET BY MOUTH DAILY, Disp: , Rfl:     Pyridoxine HCl (VITAMIN B-6 PO), Take by mouth daily., Disp: , Rfl:       Objective:     Vitals:    09/13/23 1105   BP: 104/68   Pulse: 64   Weight: 103 kg (227 lb 12.8 oz)   Height: 172.7 cm (67.99\")     Body mass index is 34.65 kg/m².     24 Hour Holter monitor 06/20/2022:  1.  Normal sinus rhythm  2.  Rare PACs  3.  No PVCs detected  4.  No pauses or high degree AV block detected  5.  Atrial fibrillation not detected     External Cardioversion 03/14/2022:  Post cardioversion the patient displayed a sinus rhythm.  The cardioversion was successful.    2D Echocardiogram 11/12/2021:  LVEF 64%, mild LVH, grade 1 diastolic dysfunction  Normal RV size and systolic function  Normal LA size, normal RA size  AV abnormal in structure, trace AI, no AAS, AV leaflets moderately calcified  Structurally normal MV, no significant MS, trace MR  Trace TR  No dilation of aortic root, normal IVC size with normal IVC inspiratory collapse of greater than 50% noted    Lexiscan stress test 5/2/2014:  Normal 2-day Lexiscan stress test without evidence of ischemia or prior infarction.  LV EF 64%    2D Echocardiogram 5/2/2014:  Mild LVH, EF 65%, normal diastolic function  LA normal size  Normal RV size and systolic function  Normal RA size  No AI, no AS  No M RRR, no MS  Trace TR      PHYSICAL EXAM:    Constitutional:       Appearance: Healthy appearance. Not in distress.   Neck:      Vascular: No JVR. JVD normal.   Pulmonary:      Effort: Pulmonary effort is normal.      Breath sounds: Normal breath sounds. No wheezing. No " rhonchi. No rales.   Chest:      Chest wall: Not tender to palpatation.   Cardiovascular:      PMI at left midclavicular line. Normal rate. Regular rhythm. Normal S1. Normal S2.       Murmurs: There is no murmur.      No gallop.  No click. No rub.      Comments: Chronic generalized bilateral lower extremity edema  Pulses:     Intact distal pulses.   Edema:     Peripheral edema absent.   Abdominal:      General: Bowel sounds are normal.      Palpations: Abdomen is soft.      Tenderness: There is no abdominal tenderness.   Musculoskeletal: Normal range of motion.         General: No tenderness. Skin:     General: Skin is warm and dry.   Neurological:      General: No focal deficit present.      Mental Status: Alert and oriented to person, place and time.         ECG 12 Lead    Date/Time: 9/13/2023 12:07 PM  Performed by: Elicia Lemos APRN  Authorized by: Elicia Lemos APRN   Comparison: compared with previous ECG from 8/24/2022  Similar to previous ECG  Rhythm: sinus rhythm  Rate: normal  BPM: 64  Conduction: conduction normal    Clinical impression: normal ECG          Assessment:       Diagnosis Plan   1. Coronary artery disease involving native coronary artery of native heart without angina pectoris        2. Chronic diastolic congestive heart failure        3. Essential hypertension        4. Hyperlipidemia LDL goal <70        5. Paroxysmal atrial fibrillation          No orders of the defined types were placed in this encounter.         Plan:       1.  Paroxysmal atrial fibrillation:  status post cardioversion 2022.  Has remained in sinus rhythm.  On beta blocker.   2.  Hypertension: Well-controlled.  Slightly lower than normal.  We will decrease metoprolol to tartrate to 25 mg twice daily.  Patient will monitor blood pressure on a normal basis at home and let me know how readings look..  3.  Coronary artery disease: Status post PCI/DRE to LAD and RCA.  On beta blocker, long-acting nitrate, aspirin  and statin. No angina. NO ischemia.   4.  Chronic diastolic heart failure: Euvolemic  5.  Hyperlipidemia: Lipid panel in target range.  Followed by PCP.  On statin therapy.  6.  Obstructive sleep apnea: Mild. Will start wearing CPAP again.      Ms. Sheldon will follow up with  in 6 months.  She will call sooner for any questions or concerns         Your medication list            Accurate as of September 13, 2023 12:06 PM. If you have any questions, ask your nurse or doctor.                CHANGE how you take these medications        Instructions Last Dose Given Next Dose Due   metoprolol tartrate 25 MG tablet  Commonly known as: LOPRESSOR  What changed:   medication strength  how much to take  Changed by: MIKE Chen      Take 1 tablet by mouth 2 (Two) Times a Day.              CONTINUE taking these medications        Instructions Last Dose Given Next Dose Due   aspirin 81 MG EC tablet      Take 1 tablet by mouth Daily.       atorvastatin 20 MG tablet  Commonly known as: LIPITOR      TAKE ONE TABLET BY MOUTH DAILY       fish oil 1000 MG capsule capsule      Take 2 capsules by mouth Daily With Breakfast.       isosorbide mononitrate 30 MG 24 hr tablet  Commonly known as: IMDUR      TAKE ONE TABLET BY MOUTH DAILY       levothyroxine 25 MCG tablet  Commonly known as: SYNTHROID, LEVOTHROID      Take 1 tablet by mouth Daily.       multivitamin with minerals tablet tablet      Take  by mouth.       nitroglycerin 0.4 MG SL tablet  Commonly known as: NITROSTAT      Place under the tongue.       pantoprazole 40 MG EC tablet  Commonly known as: PROTONIX      TAKE ONE TABLET BY MOUTH DAILY       VITAMIN B-6 PO      Take by mouth daily.                 Where to Get Your Medications        Information about where to get these medications is not yet available    Ask your nurse or doctor about these medications  metoprolol tartrate 25 MG tablet           As always, it has been a pleasure to participate in your  patient's care.      Sincerely,       MIKE Levine

## 2023-09-27 ENCOUNTER — TELEPHONE (OUTPATIENT)
Dept: CARDIOLOGY | Facility: CLINIC | Age: 83
End: 2023-09-27
Payer: MEDICARE

## 2023-09-27 NOTE — TELEPHONE ENCOUNTER
Pt called with B/P & HR LOG after decreasing Metoprolol to 25 mg BID.   She is currently not having any symptoms.   She only had 1 episode when out to dinner before eating she had a little dizziness. She feels may be low blood sugar.   Readings are from -  Morning and evenin/14 before meds 135/70, 10:15 am 1 hr after 129/66 and 10:30 pm 1 hr after meds 112/66.   9/15 9:15 am 119/62 Hr 63       9:00 pm 106/62 hr 65  : 10:15 am  117/66 Hr 60   10:45 pm  126/66 hr 77   : 10:15 am 125/77 hr 82     9:30 pm 125/70 hr 62    : 10:20 am 125/70 hr 72     8:22 pm 122/71 hr 82   : 10:30 am 114/64 hr 63      8:22 pm 123/68 hr 63  : 11:00 am 114/72  Hr 72   10:00 pm 102/60 hr 67   12:20 pm  109/69 hr 83      9:00 pm  109/64   Hr 68  : 11:15 am 109/69 hr 73       7:00 pm 104/62 he 68  : 12:15 pm 101/62 hr 68       12:40 am 114/69   Hr 73   : 12:40 pm 106/60 Hr 76       10:00 pm 125/65  hr 64  : 11:06 am 108/77 HE 88       9:58  108/59 Hr 77   9:30 am 123/70 hr 73          10:20 pm  106/59  hr 64   Please advise any changes  Pt's call back # [phone]   Thanks Vaughn RIZZO

## 2023-09-28 NOTE — TELEPHONE ENCOUNTER
I called pt and let her know no changes and B/P & HR look good.   I did advise her to call if any symptoms occur.   Vaughn RIZZO

## 2023-10-31 NOTE — TELEPHONE ENCOUNTER
[phone]  9:34 am    Pt called and lm that she saw RL on 5/4/22 and she was to call if she had any more nosebleeds.     She states on 5/6 she had two nosebleeds-one last 30 min and the other 20 min.      Can you advise?    [facility]   Lm for pt to call office back. janae

## 2023-11-14 RX ORDER — ISOSORBIDE MONONITRATE 30 MG/1
30 TABLET, EXTENDED RELEASE ORAL DAILY
Qty: 90 TABLET | Refills: 1 | Status: SHIPPED | OUTPATIENT
Start: 2023-11-14

## 2024-02-02 RX ORDER — ATORVASTATIN CALCIUM 20 MG/1
20 TABLET, FILM COATED ORAL DAILY
Qty: 90 TABLET | Refills: 1 | Status: SHIPPED | OUTPATIENT
Start: 2024-02-02

## 2024-03-21 ENCOUNTER — OFFICE VISIT (OUTPATIENT)
Age: 84
End: 2024-03-21
Payer: MEDICARE

## 2024-03-21 VITALS
HEART RATE: 71 BPM | DIASTOLIC BLOOD PRESSURE: 84 MMHG | HEIGHT: 67 IN | WEIGHT: 217 LBS | SYSTOLIC BLOOD PRESSURE: 148 MMHG | BODY MASS INDEX: 34.06 KG/M2

## 2024-03-21 DIAGNOSIS — I48.0 PAROXYSMAL ATRIAL FIBRILLATION: Primary | ICD-10-CM

## 2024-03-21 DIAGNOSIS — I25.10 CORONARY ARTERY DISEASE INVOLVING NATIVE CORONARY ARTERY OF NATIVE HEART WITHOUT ANGINA PECTORIS: ICD-10-CM

## 2024-03-21 NOTE — PROGRESS NOTES
Subjective:     Encounter Date: 24      Patient ID: Mellisa Sheldon is a 83 y.o. female.    Chief Complaint: CAD, CHF, atrial fibrillation  HPI:   This is an 83-year-old woman whohas a history of CAD with an anterior wall MI in  with PCI of the LAD followed by PCI of the RCA in .  Her she has ischemic cardiomyopathy with recovered EF.  She has a history of atrial fibrillation status post cardioversion.   Apparently she had severe epistaxis with Eliquis and as a result is only on ASA 81. Post cardioversion she has had no further AF even off of amiodarone. She has not had angina since her MI in .     She continues to do well.  She has no complaints.  Her labs reviewed today are all at goal.  She wears a CPAP for sleep apnea    She is 1 of 8 children.  Only she and her younger sister remaiN.  She lives with her daughter.  The following portions of the patient's history were reviewed and updated as appropriate: allergies, current medications, past family history, past medical history, past social history, past surgical history and problem list.     REVIEW OF SYSTEMS:   All systems reviewed.  Pertinent positives identified in HPI.  All other systems are negative.    Past Medical History:   Diagnosis Date    GERD (gastroesophageal reflux disease)     Hyperlipidemia     Ischemic cardiomyopathy     Myocardial infarction     AIDA (obstructive sleep apnea) 2022    Home sleep study.  Weight 222 pounds.  Mild AIDA with AHI 10 events per hour.  No sleep-related hypoxia.       Family History   Problem Relation Age of Onset    Cancer Mother     Dementia Sister        Social History     Socioeconomic History    Marital status:    Tobacco Use    Smoking status: Former     Current packs/day: 0.00     Average packs/day: 2.0 packs/day for 40.0 years (80.0 ttl pk-yrs)     Types: Cigarettes     Start date: 10/14/1969     Quit date: 10/14/2009     Years since quittin.4    Smokeless tobacco: Never     Tobacco comments:     caffeine use : 2 cups daily.   Vaping Use    Vaping status: Never Used   Substance and Sexual Activity    Alcohol use: No    Drug use: No    Sexual activity: Not Currently       Allergies   Allergen Reactions    Diclofenac Sodium Unknown - Low Severity    Celecoxib Itching    Ibuprofen Itching    Niacin Other (See Comments)     unknown    Nsaids Itching       Past Surgical History:   Procedure Laterality Date    CORONARY ANGIOPLASTY      angioplasties of the LAD and the right coronary artery both with stent placements       Procedures       Objective:         PHYSICAL EXAM:  GEN: VSS, no distress,   Eyes: normal sclera, normal lids and lashes  HENT: moist mucus membranes,   Respiratory: CTAB, no rales or wheezes  CV: RRR, no murmurs, , +2 DP and 2+ carotid pulses b/l  GI: NABS, soft,  Nontender, nondistended  MSK: no edema, no scoliosis or kyphosis  Skin: no rash, warm, dry  Heme/Lymph: no bruising or bleeding  Psych: organized thought, normal behavior and affect  Neuro: Cranial nerves grossly intact, Alert and Oriented x 3.         Assessment:          Diagnosis Plan   1. Paroxysmal atrial fibrillation        2. Coronary artery disease involving native coronary artery of native heart without angina pectoris             Plan:       1. AF: paroxysmal AF, on aspirin and metoprolol.  2. CAD h/o MI with PCI fo the LAD and RCA in 2011, no further angina  3. Ischemic CM: EF recovered. No CHF symptoms.   4. HLD AT goaL.      Dr. Kwon, thank you very much for referring this kind patient to me. Please call me with any questions or concerns. I will see the patient again in the office in 12 months.          Isa Goldstein MD  03/21/24  [facility]    Outpatient Encounter Medications as of 3/21/2024   Medication Sig Dispense Refill    aspirin 81 MG EC tablet Take 1 tablet by mouth Daily.      atorvastatin (LIPITOR) 20 MG tablet Take 1 tablet by mouth Daily. 90 tablet 1    isosorbide  mononitrate (IMDUR) 30 MG 24 hr tablet Take 1 tablet by mouth Daily. 90 tablet 1    metoprolol tartrate (LOPRESSOR) 25 MG tablet Take 1 tablet by mouth 2 (Two) Times a Day. 180 tablet 3    Multiple Vitamins-Minerals (PRESERVISION AREDS PO) Take  by mouth.      nitroglycerin (NITROSTAT) 0.4 MG SL tablet Place under the tongue.      Omega-3 Fatty Acids (FISH OIL) 1000 MG capsule capsule Take 2 capsules by mouth Daily With Breakfast.      pantoprazole (PROTONIX) 40 MG EC tablet TAKE ONE TABLET BY MOUTH DAILY      Pyridoxine HCl (VITAMIN B-6 PO) Take by mouth daily.      levothyroxine (SYNTHROID, LEVOTHROID) 25 MCG tablet Take 1 tablet by mouth Daily.       No facility-administered encounter medications on file as of 3/21/2024.

## 2024-05-13 RX ORDER — ISOSORBIDE MONONITRATE 30 MG/1
30 TABLET, EXTENDED RELEASE ORAL DAILY
Qty: 90 TABLET | Refills: 1 | Status: SHIPPED | OUTPATIENT
Start: 2024-05-13

## 2024-07-09 NOTE — TELEPHONE ENCOUNTER
Caller: Mellisa Sheldon    Relationship: Self    Best call back number: [phone] (home)      Requested Prescriptions:   Requested Prescriptions     Pending Prescriptions Disp Refills    metoprolol tartrate (LOPRESSOR) 25 MG tablet       Sig: Take 1 tablet by mouth 2 (Two) Times a Day.        Pharmacy where request should be sent: [facility] [id number] [facility] [address] [facility] [phone] [facility] [phone] FX     Last office visit with prescribing clinician: 3/21/2024   Last telemedicine visit with prescribing clinician: Visit date not found   Next office visit with prescribing clinician: Visit date not found     Additional details provided by patient: PT IS ABOUT OUT OF THIS MEDICATION AND NEEDS A REFILL CALLED IN.     Does the patient have less than a 3 day supply:  [] Yes  [x] No    Would you like a call back once the refill request has been completed: [] Yes [x] No    If the office needs to give you a call back, can they leave a voicemail: [x] Yes [] No    Inderjit Humphries   07/09/24 13:53 EDT

## 2024-07-31 RX ORDER — ATORVASTATIN CALCIUM 20 MG/1
20 TABLET, FILM COATED ORAL DAILY
Qty: 90 TABLET | Refills: 1 | Status: SHIPPED | OUTPATIENT
Start: 2024-07-31

## 2024-07-31 NOTE — TELEPHONE ENCOUNTER
Refill Protocol Failed, Requesting Atorvastatin 40mg QD    LOV w/ you 3/21/24  FU w/ MIKE Rios  Last Labs- 11/8/23    Please review and sign.    MARILYNN Clarke

## 2024-08-23 ENCOUNTER — OFFICE VISIT (OUTPATIENT)
Dept: CARDIOLOGY | Facility: CLINIC | Age: 84
End: 2024-08-23
Payer: MEDICARE

## 2024-08-23 VITALS
HEART RATE: 72 BPM | HEIGHT: 67 IN | BODY MASS INDEX: 34.37 KG/M2 | DIASTOLIC BLOOD PRESSURE: 70 MMHG | OXYGEN SATURATION: 97 % | SYSTOLIC BLOOD PRESSURE: 130 MMHG | WEIGHT: 219 LBS

## 2024-08-23 DIAGNOSIS — I48.0 PAROXYSMAL ATRIAL FIBRILLATION: Primary | ICD-10-CM

## 2024-08-23 PROCEDURE — 3075F SYST BP GE 130 - 139MM HG: CPT | Performed by: NURSE PRACTITIONER

## 2024-08-23 PROCEDURE — 1160F RVW MEDS BY RX/DR IN RCRD: CPT | Performed by: NURSE PRACTITIONER

## 2024-08-23 PROCEDURE — 99214 OFFICE O/P EST MOD 30 MIN: CPT | Performed by: NURSE PRACTITIONER

## 2024-08-23 PROCEDURE — 3078F DIAST BP <80 MM HG: CPT | Performed by: NURSE PRACTITIONER

## 2024-08-23 PROCEDURE — 93000 ELECTROCARDIOGRAM COMPLETE: CPT | Performed by: NURSE PRACTITIONER

## 2024-08-23 PROCEDURE — 1159F MED LIST DOCD IN RCRD: CPT | Performed by: NURSE PRACTITIONER

## 2024-08-23 NOTE — PROGRESS NOTES
Date of Office Visit: 2024  Encounter Provider: MIKE Zamorano  Place of Service: [facility]ARDI[facility]  Patient Name: Mellisa Sheldon  :1940    Chief complaint:  CAD, CHF and afib    HPI: Mellisa Sheldon is a 83 y.o. female who is a patient of Dr. Goldstein and is new to me today.  She has a history of coronary artery disease with an anterior wall MI in .  She had PCI at that time of the LAD followed by PCI of the right coronary in .  She has an ischemic cardiomyopathy with a recovered ejection fraction.  She also has a history of atrial fibrillation and underwent cardioversion in the past.  She been on Eliquis during that time and had some severe epistaxis.  She had required cautery and then after that had another episode of epistaxis.  As a result she is only taking aspirin.  She has had no further atrial fibrillation since that time even off amiodarone.  She has sleep apnea she wears a CPAP machine she is 1 of 8 children.    She comes in today about a month or so ago she started having heart racing.  Her neighbor who is a phlebotomist told her that it could be caused by her thyroid pill.  So she stopped her Synthroid.  Then she continued having palpitations she also not been wearing her CPAP regularly.  She has metoprolol which she takes 25 mg twice a day 3 days ago her Apple Watch said she was in A-fib and she took started taking a third metoprolol in the middle of the day.  Since that time she has not had any recurrence.  She still has a little swelling in her legs.  Previous testing and notes have been reviewed by me.   Past Medical History:   Diagnosis Date    GERD (gastroesophageal reflux disease)     Hyperlipidemia     Ischemic cardiomyopathy     Myocardial infarction     AIDA (obstructive sleep apnea) 2022    Home sleep study.  Weight 222 pounds.  Mild AIDA with AHI 10 events per hour.  No sleep-related hypoxia.       Past Surgical History:    Procedure Laterality Date    CORONARY ANGIOPLASTY      angioplasties of the LAD and the right coronary artery both with stent placements       Social History     Socioeconomic History    Marital status:    Tobacco Use    Smoking status: Former     Current packs/day: 0.00     Average packs/day: 2.0 packs/day for 40.0 years (80.0 ttl pk-yrs)     Types: Cigarettes     Start date: 10/14/1969     Quit date: 10/14/2009     Years since quittin.8    Smokeless tobacco: Never    Tobacco comments:     caffeine use : 2 cups daily.   Vaping Use    Vaping status: Never Used   Substance and Sexual Activity    Alcohol use: No    Drug use: No    Sexual activity: Not Currently       Family History   Problem Relation Age of Onset    Cancer Mother     Dementia Sister        Review of Systems   Constitutional: Negative for diaphoresis and malaise/fatigue.   Cardiovascular:  Positive for palpitations. Negative for chest pain, claudication, dyspnea on exertion, irregular heartbeat, leg swelling, near-syncope, orthopnea, paroxysmal nocturnal dyspnea and syncope.   Respiratory:  Negative for cough, shortness of breath and sleep disturbances due to breathing.    Musculoskeletal:  Negative for falls.   Neurological:  Negative for dizziness and weakness.   Psychiatric/Behavioral:  Negative for altered mental status and substance abuse.        Allergies   Allergen Reactions    Diclofenac Sodium Unknown - Low Severity    Celecoxib Itching    Ibuprofen Itching    Niacin Other (See Comments)     unknown    Nsaids Itching         Current Outpatient Medications:     aspirin 81 MG EC tablet, Take 1 tablet by mouth Daily., Disp: , Rfl:     atorvastatin (LIPITOR) 20 MG tablet, TAKE 1 TABLET BY MOUTH DAILY, Disp: 90 tablet, Rfl: 1    isosorbide mononitrate (IMDUR) 30 MG 24 hr tablet, TAKE 1 TABLET BY MOUTH DAILY, Disp: 90 tablet, Rfl: 1    metoprolol tartrate (LOPRESSOR) 25 MG tablet, Take 1 tablet by mouth 2 (Two) Times a Day., Disp: 180  "tablet, Rfl: 4    Multiple Vitamins-Minerals (PRESERVISION AREDS PO), Take  by mouth., Disp: , Rfl:     nitroglycerin (NITROSTAT) 0.4 MG SL tablet, Place under the tongue., Disp: , Rfl:     Omega-3 Fatty Acids (FISH OIL) 1000 MG capsule capsule, Take 2 capsules by mouth Daily With Breakfast., Disp: , Rfl:     pantoprazole (PROTONIX) 40 MG EC tablet, TAKE ONE TABLET BY MOUTH DAILY, Disp: , Rfl:     Pyridoxine HCl (VITAMIN B-6 PO), Take by mouth daily., Disp: , Rfl:     levothyroxine (SYNTHROID, LEVOTHROID) 25 MCG tablet, Take 1 tablet by mouth Daily., Disp: , Rfl:       Objective:     Vitals:    08/23/24 1311   BP: 130/70   BP Location: Left arm   Patient Position: Sitting   SpO2: 97%   Weight: 99.3 kg (219 lb)   Height: 170.2 cm (67\")     Body mass index is 34.3 kg/m².    PHYSICAL EXAM:    Constitutional:       General: Not in acute distress.     Appearance: Normal appearance. Well-developed.   Eyes:      Pupils: Pupils are equal, round, and reactive to light.   HENT:      Head: Normocephalic.   Neck:      Vascular: No carotid bruit or JVD.   Pulmonary:      Effort: Pulmonary effort is normal. No tachypnea.      Breath sounds: Normal breath sounds. No wheezing. No rales.   Cardiovascular:      Normal rate. Regular rhythm.      No gallop.    Pulses:     Intact distal pulses.   Edema:     Peripheral edema absent.   Abdominal:      General: Bowel sounds are normal.      Palpations: Abdomen is soft.      Tenderness: There is no abdominal tenderness.   Musculoskeletal: Normal range of motion.      Cervical back: Normal range of motion and neck supple. No edema. Skin:     General: Skin is warm and dry.   Neurological:      Mental Status: Alert and oriented to person, place, and time.           ECG 12 Lead    Date/Time: 8/23/2024 1:33 PM  Performed by: Blanca Forrester APRN    Authorized by: Blanca Forrester APRN  Comparison: compared with previous ECG from 9/13/2023  Similar to previous ECG  Rhythm: sinus " rhythm  Rate: normal  Q waves: V2 and V3    QRS axis: left    Clinical impression: non-specific ECG          Assessment:      1.  Palpitations-will place Zio monitor I think she probably had recurrent A-fib which is multifactorial in relationship to her thyroid, not wearing her CPAP regularly and age.  Will go ahead and let her continue to take an extra metoprolol a day we will do 50 mg in the morning and 25mg at night, this seems to be working for her.  Continue aspirin.    2.  Hypothyroidism-encouraged compliance with Synthroid and to follow-up with primary care for labs    3.  Sleep apnea encouraged compliance with CPAP machine nightly    4.  History of coronary artery disease no angina symptoms continue aspirin and statin.  Plan:       Follow-up in 1 months with Dr. Goldstein.         Your medication list            Accurate as of August 23, 2024  1:29 PM. If you have any questions, ask your nurse or doctor.                CONTINUE taking these medications        Instructions Last Dose Given Next Dose Due   aspirin 81 MG EC tablet      Take 1 tablet by mouth Daily.       atorvastatin 20 MG tablet  Commonly known as: LIPITOR      TAKE 1 TABLET BY MOUTH DAILY       fish oil 1000 MG capsule capsule      Take 2 capsules by mouth Daily With Breakfast.       isosorbide mononitrate 30 MG 24 hr tablet  Commonly known as: IMDUR      TAKE 1 TABLET BY MOUTH DAILY       levothyroxine 25 MCG tablet  Commonly known as: SYNTHROID, LEVOTHROID      Take 1 tablet by mouth Daily.       metoprolol tartrate 25 MG tablet  Commonly known as: LOPRESSOR      Take 1 tablet by mouth 2 (Two) Times a Day.       multivitamin with minerals tablet tablet      Take  by mouth.       nitroglycerin 0.4 MG SL tablet  Commonly known as: NITROSTAT      Place under the tongue.       pantoprazole 40 MG EC tablet  Commonly known as: PROTONIX      TAKE ONE TABLET BY MOUTH DAILY       VITAMIN B-6 PO      Take by mouth daily.                  As always, it has  been a pleasure to participate in your patient's care.      Sincerely,     Blanca MCKEON

## 2024-09-15 ENCOUNTER — APPOINTMENT (OUTPATIENT)
Dept: GENERAL RADIOLOGY | Facility: HOSPITAL | Age: 84
End: 2024-09-15
Payer: MEDICARE

## 2024-09-15 ENCOUNTER — HOSPITAL ENCOUNTER (EMERGENCY)
Facility: HOSPITAL | Age: 84
Discharge: HOME | End: 2024-09-15
Attending: EMERGENCY MEDICINE | Admitting: EMERGENCY MEDICINE
Payer: MEDICARE

## 2024-09-15 VITALS
HEIGHT: 67 IN | TEMPERATURE: 97.8 F | WEIGHT: 218.92 LBS | RESPIRATION RATE: 16 BRPM | SYSTOLIC BLOOD PRESSURE: 110 MMHG | OXYGEN SATURATION: 97 % | HEART RATE: 62 BPM | DIASTOLIC BLOOD PRESSURE: 54 MMHG | BODY MASS INDEX: 34.36 KG/M2

## 2024-09-15 DIAGNOSIS — R42 DIZZY: ICD-10-CM

## 2024-09-15 DIAGNOSIS — E05.90 HYPERTHYROIDISM: ICD-10-CM

## 2024-09-15 DIAGNOSIS — I48.0 PAROXYSMAL ATRIAL FIBRILLATION: Primary | ICD-10-CM

## 2024-09-15 LAB
ALBUMIN SERPL-MCNC: 4 G/DL (ref 3.5–5.2)
ALBUMIN/GLOB SERPL: 1.8 G/DL
ALP SERPL-CCNC: 82 U/L (ref 39–117)
ALT SERPL W P-5'-P-CCNC: 20 U/L (ref 1–33)
ANION GAP SERPL CALCULATED.3IONS-SCNC: 10.6 MMOL/L (ref 5–15)
AST SERPL-CCNC: 21 U/L (ref 1–32)
BASOPHILS # BLD AUTO: 0.03 10*3/MM3 (ref 0–0.2)
BASOPHILS NFR BLD AUTO: 0.5 % (ref 0–1.5)
BILIRUB SERPL-MCNC: 0.7 MG/DL (ref 0–1.2)
BUN SERPL-MCNC: 21 MG/DL (ref 8–23)
BUN/CREAT SERPL: 25 (ref 7–25)
CALCIUM SPEC-SCNC: 9.2 MG/DL (ref 8.6–10.5)
CHLORIDE SERPL-SCNC: 106 MMOL/L (ref 98–107)
CO2 SERPL-SCNC: 23.4 MMOL/L (ref 22–29)
CREAT SERPL-MCNC: 0.84 MG/DL (ref 0.57–1)
DEPRECATED RDW RBC AUTO: 44.2 FL (ref 37–54)
EGFRCR SERPLBLD CKD-EPI 2021: 68.6 ML/MIN/1.73
EOSINOPHIL # BLD AUTO: 0.05 10*3/MM3 (ref 0–0.4)
EOSINOPHIL NFR BLD AUTO: 0.9 % (ref 0.3–6.2)
ERYTHROCYTE [DISTWIDTH] IN BLOOD BY AUTOMATED COUNT: 12.2 % (ref 12.3–15.4)
GLOBULIN UR ELPH-MCNC: 2.2 GM/DL
GLUCOSE SERPL-MCNC: 100 MG/DL (ref 65–99)
HCT VFR BLD AUTO: 37.2 % (ref 34–46.6)
HGB BLD-MCNC: 12.2 G/DL (ref 12–15.9)
HOLD SPECIMEN: NORMAL
HOLD SPECIMEN: NORMAL
IMM GRANULOCYTES # BLD AUTO: 0.01 10*3/MM3 (ref 0–0.05)
IMM GRANULOCYTES NFR BLD AUTO: 0.2 % (ref 0–0.5)
LYMPHOCYTES # BLD AUTO: 0.71 10*3/MM3 (ref 0.7–3.1)
LYMPHOCYTES NFR BLD AUTO: 13 % (ref 19.6–45.3)
MAGNESIUM SERPL-MCNC: 2.2 MG/DL (ref 1.6–2.4)
MCH RBC QN AUTO: 32.4 PG (ref 26.6–33)
MCHC RBC AUTO-ENTMCNC: 32.8 G/DL (ref 31.5–35.7)
MCV RBC AUTO: 98.7 FL (ref 79–97)
MONOCYTES # BLD AUTO: 0.28 10*3/MM3 (ref 0.1–0.9)
MONOCYTES NFR BLD AUTO: 5.1 % (ref 5–12)
NEUTROPHILS NFR BLD AUTO: 4.38 10*3/MM3 (ref 1.7–7)
NEUTROPHILS NFR BLD AUTO: 80.3 % (ref 42.7–76)
NRBC BLD AUTO-RTO: 0 /100 WBC (ref 0–0.2)
PLATELET # BLD AUTO: 141 10*3/MM3 (ref 140–450)
PMV BLD AUTO: 11.1 FL (ref 6–12)
POTASSIUM SERPL-SCNC: 3.9 MMOL/L (ref 3.5–5.2)
PROT SERPL-MCNC: 6.2 G/DL (ref 6–8.5)
QT INTERVAL: 467 MS
QTC INTERVAL: 463 MS
RBC # BLD AUTO: 3.77 10*6/MM3 (ref 3.77–5.28)
SODIUM SERPL-SCNC: 140 MMOL/L (ref 136–145)
T4 FREE SERPL-MCNC: 1.82 NG/DL (ref 0.92–1.68)
TROPONIN T SERPL HS-MCNC: 11 NG/L
TSH SERPL DL<=0.05 MIU/L-ACNC: 0.03 UIU/ML (ref 0.27–4.2)
WBC NRBC COR # BLD AUTO: 5.46 10*3/MM3 (ref 3.4–10.8)
WHOLE BLOOD HOLD COAG: NORMAL
WHOLE BLOOD HOLD SPECIMEN: NORMAL

## 2024-09-15 PROCEDURE — 84443 ASSAY THYROID STIM HORMONE: CPT | Performed by: EMERGENCY MEDICINE

## 2024-09-15 PROCEDURE — 84484 ASSAY OF TROPONIN QUANT: CPT | Performed by: EMERGENCY MEDICINE

## 2024-09-15 PROCEDURE — 71045 X-RAY EXAM CHEST 1 VIEW: CPT

## 2024-09-15 PROCEDURE — 93005 ELECTROCARDIOGRAM TRACING: CPT | Performed by: EMERGENCY MEDICINE

## 2024-09-15 PROCEDURE — 99284 EMERGENCY DEPT VISIT MOD MDM: CPT

## 2024-09-15 PROCEDURE — 83735 ASSAY OF MAGNESIUM: CPT | Performed by: EMERGENCY MEDICINE

## 2024-09-15 PROCEDURE — 80053 COMPREHEN METABOLIC PANEL: CPT | Performed by: EMERGENCY MEDICINE

## 2024-09-15 PROCEDURE — 93010 ELECTROCARDIOGRAM REPORT: CPT | Performed by: INTERNAL MEDICINE

## 2024-09-15 PROCEDURE — 85025 COMPLETE CBC W/AUTO DIFF WBC: CPT

## 2024-09-15 PROCEDURE — 84439 ASSAY OF FREE THYROXINE: CPT | Performed by: EMERGENCY MEDICINE

## 2024-09-15 RX ORDER — LEVOTHYROXINE SODIUM 25 UG/1
12.5 TABLET ORAL DAILY
Qty: 30 TABLET | Refills: 0 | Status: SHIPPED | OUTPATIENT
Start: 2024-09-15

## 2024-09-15 RX ORDER — SODIUM CHLORIDE 0.9 % (FLUSH) 0.9 %
10 SYRINGE (ML) INJECTION AS NEEDED
Status: DISCONTINUED | OUTPATIENT
Start: 2024-09-15 | End: 2024-09-15 | Stop reason: HOSPADM

## 2024-09-17 ENCOUNTER — OFFICE VISIT (OUTPATIENT)
Age: 84
End: 2024-09-17
Payer: MEDICARE

## 2024-09-17 VITALS
BODY MASS INDEX: 34.21 KG/M2 | HEART RATE: 70 BPM | SYSTOLIC BLOOD PRESSURE: 118 MMHG | DIASTOLIC BLOOD PRESSURE: 70 MMHG | HEIGHT: 67 IN | WEIGHT: 218 LBS

## 2024-09-17 DIAGNOSIS — I48.0 PAROXYSMAL ATRIAL FIBRILLATION: Primary | ICD-10-CM

## 2024-09-17 PROCEDURE — 99214 OFFICE O/P EST MOD 30 MIN: CPT | Performed by: INTERNAL MEDICINE

## 2024-09-17 PROCEDURE — 3074F SYST BP LT 130 MM HG: CPT | Performed by: INTERNAL MEDICINE

## 2024-09-17 PROCEDURE — 3078F DIAST BP <80 MM HG: CPT | Performed by: INTERNAL MEDICINE

## 2024-09-17 RX ORDER — ATENOLOL 25 MG/1
25 TABLET ORAL 2 TIMES DAILY
Qty: 30 TABLET | Refills: 11 | Status: SHIPPED | OUTPATIENT
Start: 2024-09-17

## 2024-09-17 RX ORDER — DIGOXIN 125 MCG
125 TABLET ORAL DAILY
Qty: 90 TABLET | Refills: 3 | Status: SHIPPED | OUTPATIENT
Start: 2024-09-17

## 2024-09-24 ENCOUNTER — OFFICE VISIT (OUTPATIENT)
Age: 84
End: 2024-09-24
Payer: MEDICARE

## 2024-09-24 VITALS
HEIGHT: 67 IN | WEIGHT: 218 LBS | BODY MASS INDEX: 34.21 KG/M2 | DIASTOLIC BLOOD PRESSURE: 82 MMHG | SYSTOLIC BLOOD PRESSURE: 118 MMHG | HEART RATE: 111 BPM

## 2024-09-24 DIAGNOSIS — I48.0 PAF (PAROXYSMAL ATRIAL FIBRILLATION): Primary | ICD-10-CM

## 2024-09-24 PROCEDURE — 1159F MED LIST DOCD IN RCRD: CPT | Performed by: STUDENT IN AN ORGANIZED HEALTH CARE EDUCATION/TRAINING PROGRAM

## 2024-09-24 PROCEDURE — 3079F DIAST BP 80-89 MM HG: CPT | Performed by: STUDENT IN AN ORGANIZED HEALTH CARE EDUCATION/TRAINING PROGRAM

## 2024-09-24 PROCEDURE — 99204 OFFICE O/P NEW MOD 45 MIN: CPT | Performed by: STUDENT IN AN ORGANIZED HEALTH CARE EDUCATION/TRAINING PROGRAM

## 2024-09-24 PROCEDURE — 93000 ELECTROCARDIOGRAM COMPLETE: CPT | Performed by: STUDENT IN AN ORGANIZED HEALTH CARE EDUCATION/TRAINING PROGRAM

## 2024-09-24 PROCEDURE — 1160F RVW MEDS BY RX/DR IN RCRD: CPT | Performed by: STUDENT IN AN ORGANIZED HEALTH CARE EDUCATION/TRAINING PROGRAM

## 2024-09-24 PROCEDURE — 3074F SYST BP LT 130 MM HG: CPT | Performed by: STUDENT IN AN ORGANIZED HEALTH CARE EDUCATION/TRAINING PROGRAM

## 2024-10-07 ENCOUNTER — TELEPHONE (OUTPATIENT)
Dept: CARDIOLOGY | Facility: CLINIC | Age: 84
End: 2024-10-07
Payer: MEDICARE

## 2024-10-07 NOTE — TELEPHONE ENCOUNTER
Caller: Mellisa Sheldon     Best call back number: [phone]     What is your medical concern? PT WANTING TO CONFIRMING IF SHE IS OKAY TO START THE FULL TABLET OF THE ELIQUIS - PT WAS ORIGINALLY STARTED ON MEDICATION PER DR CARVALHO AND WAS INSTRUCTED TO TAKE HALF OF THE PILL, SHE WAS INSTRUCTED BY DR ARANDA TO START A FULL PILL AND SHE WANTS TO CONFIRM SHE AGREES - SHE IS ALSO WANTING TO CLARIFY ABOUT HER EP FU AND IF AN ABLATION IS NEEDED, WOULD SHE BE INVOLVED OR WOULD DR ARANDA DO EVERYTHING - PT STATES SHE HAS BEEN SCHEDULED FOR THE 23RD AND 24TH AND WANTING TO SEE IF ONE CAN BE PUSHED OUT AS THEY ARE SIDE BY SIDE, ATTEMPTED TO RSD TO SAME DAY BUT SCHEDULES DID NOT LINE UP - PT WAS CONFUSED ON WHY SHE HAS TWO CARDIOLOGISTS, EXPLAINED THAT DR ARANDA WAS THE ELECTRICAL SIDE AND SHE IS WANTING TO CONFIRM THAT DR CARVALHO IS STILL HER MAIN CARDIOLOGIST BUT IS STILL OKAY TO SEE DR ARNADA -

## 2024-10-07 NOTE — TELEPHONE ENCOUNTER
I don't recall referring her to EP. Maybe it was done by Blanca. Nonetheless, they've seen her now. I think its ok to cancel blanca's appt and let her follow up with MY instead of doing 2 appts in a row. I will still be her cardiologist and we will discuss her care.

## 2024-10-08 NOTE — TELEPHONE ENCOUNTER
Pt notified to keep appt with Dr. Gregg on 10/23/24, but didn't need to see LR 10/24/24, she was also told that [facility] will still be her cardiologist and they will discuss her care.    I cancelled the appt w/ LR for 10/24/24    Jg cma  10/8/24  695am

## 2024-10-23 ENCOUNTER — LAB (OUTPATIENT)
Dept: LAB | Facility: HOSPITAL | Age: 84
End: 2024-10-23
Payer: MEDICARE

## 2024-10-23 ENCOUNTER — OFFICE VISIT (OUTPATIENT)
Age: 84
End: 2024-10-23
Payer: MEDICARE

## 2024-10-23 VITALS
WEIGHT: 218.6 LBS | HEART RATE: 83 BPM | BODY MASS INDEX: 34.31 KG/M2 | SYSTOLIC BLOOD PRESSURE: 132 MMHG | HEIGHT: 67 IN | DIASTOLIC BLOOD PRESSURE: 78 MMHG

## 2024-10-23 DIAGNOSIS — I48.19 PERSISTENT ATRIAL FIBRILLATION: Primary | ICD-10-CM

## 2024-10-23 DIAGNOSIS — I48.19 PERSISTENT ATRIAL FIBRILLATION: ICD-10-CM

## 2024-10-23 LAB — DIGOXIN SERPL-MCNC: 1.3 NG/ML (ref 0.6–1.2)

## 2024-10-23 PROCEDURE — 3078F DIAST BP <80 MM HG: CPT | Performed by: STUDENT IN AN ORGANIZED HEALTH CARE EDUCATION/TRAINING PROGRAM

## 2024-10-23 PROCEDURE — 93000 ELECTROCARDIOGRAM COMPLETE: CPT | Performed by: STUDENT IN AN ORGANIZED HEALTH CARE EDUCATION/TRAINING PROGRAM

## 2024-10-23 PROCEDURE — 1159F MED LIST DOCD IN RCRD: CPT | Performed by: STUDENT IN AN ORGANIZED HEALTH CARE EDUCATION/TRAINING PROGRAM

## 2024-10-23 PROCEDURE — 1160F RVW MEDS BY RX/DR IN RCRD: CPT | Performed by: STUDENT IN AN ORGANIZED HEALTH CARE EDUCATION/TRAINING PROGRAM

## 2024-10-23 PROCEDURE — 36415 COLL VENOUS BLD VENIPUNCTURE: CPT

## 2024-10-23 PROCEDURE — 3075F SYST BP GE 130 - 139MM HG: CPT | Performed by: STUDENT IN AN ORGANIZED HEALTH CARE EDUCATION/TRAINING PROGRAM

## 2024-10-23 PROCEDURE — 99213 OFFICE O/P EST LOW 20 MIN: CPT | Performed by: STUDENT IN AN ORGANIZED HEALTH CARE EDUCATION/TRAINING PROGRAM

## 2024-10-23 PROCEDURE — 80162 ASSAY OF DIGOXIN TOTAL: CPT

## 2024-10-23 NOTE — PROGRESS NOTES
[facility]  Clinical Cardiac Electrophysiology     Date of Office Visit: 10/23/2024  Encounter Provider: César Gregg MD  Patient Name: Mellisa Sheldon  :1940    History of Presenting Illness     OFFICE VISIT  Mellisa Sheldon is a 83 y.o. female with a past medical history of CAD with an anterior wall MI in  with PCI of the LAD followed by PCI of the RCA in 2012. Her she has ischemic cardiomyopathy with recovered EF, atrial fibrillation s/p cardioversion  who presents for follow-up evaluation.     She had hx of severe epistaxis about 3 years ago with Eliquis s/p cauterization, however she still continued nosebleeds on Eliquis.     In 2024, she developed palpitations.     Patient notes that she for the past several months (since about August), she has had some gradual slowing, increased dyspnea on exertion, and episodes of severe palpitations that corresponds with A-fib on her Apple Watch.  She notes that these episodes are bothersome and they have limited her ability to exercise/function.  She states that routinely before this, she was able to go up and down the stairs in the basement without any issue, however now she has to stop and rest at the top of the stairs.    At this visit, she was restarted on full dose Eliquis in preparation of possible cardioversion if she tolerates Eliquis well.       10/23/2024 : She presents with her daughter.    She notes that for the past 2-3 weeks she is more active/functional than she previously was.  However she does note that she feels some tiredness compared to prior.  She says her watch for many days will not notify her of any A-fib, but yesterday did notify her that she had 3 episodes of atrial fibrillation.  She does not know her rate during these A-fib episodes.  Today she is in A-fib, heart rate in the 80s.    Otherwise she knows she is at baseline.  She does note that she wishes to be more active and have more energy.    She notes  she has recently seen endocrinology with Bryson Bearden on 9/20/2024.  She recently had labs drawn at [place] on 10/18/2024 including thyroid levels.  She notes she has reached out to her endocrinologist office and is waiting to hear back from them regarding their assessment of her most recent labs.     Past Medical History     Past Medical History:   Diagnosis Date    GERD (gastroesophageal reflux disease)     Hyperlipidemia     Ischemic cardiomyopathy     Myocardial infarction     AIDA (obstructive sleep apnea) 07/14/2022    Home sleep study.  Weight 222 pounds.  Mild AIDA with AHI 10 events per hour.  No sleep-related hypoxia.     Past Surgical History:   Procedure Laterality Date    CORONARY ANGIOPLASTY      angioplasties of the LAD and the right coronary artery both with stent placements         Medications     Current Outpatient Medications on File Prior to Visit   Medication Sig Dispense Refill    apixaban (ELIQUIS) 5 MG tablet tablet Take 1 tablet by mouth 2 (Two) Times a Day. 60 tablet 3    aspirin 81 MG EC tablet Take 1 tablet by mouth Daily.      atenolol (TENORMIN) 25 MG tablet Take 1 tablet by mouth 2 (Two) Times a Day. 30 tablet 11    atorvastatin (LIPITOR) 20 MG tablet TAKE 1 TABLET BY MOUTH DAILY 90 tablet 1    digoxin (LANOXIN) 125 MCG tablet Take 1 tablet by mouth Daily. 90 tablet 3    isosorbide mononitrate (IMDUR) 30 MG 24 hr tablet TAKE 1 TABLET BY MOUTH DAILY 90 tablet 1    levothyroxine (SYNTHROID, LEVOTHROID) 25 MCG tablet Take 0.5 tablets by mouth Daily. 30 tablet 0    Multiple Vitamins-Minerals (PRESERVISION AREDS PO) Take  by mouth.      Omega-3 Fatty Acids (FISH OIL) 1000 MG capsule capsule Take 2 capsules by mouth Daily With Breakfast.      pantoprazole (PROTONIX) 40 MG EC tablet TAKE ONE TABLET BY MOUTH DAILY      Pyridoxine HCl (VITAMIN B-6 PO) Take by mouth daily.      VITAMIN D PO Take  by mouth Daily.       No current facility-administered medications on file prior to visit.          "  Allergies     Allergies   Allergen Reactions    Diclofenac Sodium Unknown - Low Severity    Celecoxib Itching    Ibuprofen Itching    Niacin Other (See Comments)     unknown    Nsaids Itching           Family History     Family History   Problem Relation Age of Onset    Cancer Mother     Dementia Sister            Social History     Social History     Socioeconomic History    Marital status:    Tobacco Use    Smoking status: Former     Current packs/day: 0.00     Average packs/day: 2.0 packs/day for 40.0 years (80.0 ttl pk-yrs)     Types: Cigarettes     Start date: 10/14/1969     Quit date: 10/14/2009     Years since quitting: 15.0    Smokeless tobacco: Never    Tobacco comments:     caffeine use : 2 cups daily.   Vaping Use    Vaping status: Never Used   Substance and Sexual Activity    Alcohol use: No    Drug use: No    Sexual activity: Not Currently        Review of Systems: All systems reviewed. Pertinent positives identified in HPI. All other systems are negative.         Physical Examination     Vitals:    10/23/24 1016   BP: 132/78   BP Location: Left arm   Patient Position: Sitting   Cuff Size: Small Adult   Pulse: 83   Weight: 99.2 kg (218 lb 9.6 oz)   Height: 170.2 cm (67\")     Body mass index is 34.24 kg/m².     Gen: Well nourished; Alert  Skin: no visible rashes and no abnormalities with palpation  Eyes: no evidence of visual defects and normal sclera  Ears: no abnormalities.  Mouth: normal teeth and appropriately moist mucous membranes  Chest: clear to auscultation. There is normal respiratory effort and expansion.  CV: examination showed a irregular rhythm. S1 and S2 were normal.   Abd: no visible distension.   Neurologic:Cranial nerves appear intact; Sensation normal; no localizing signs        Laboratory Studies (Reviewed by provider)    Lab Results   Component Value Date    WBC 5.46 09/15/2024    HGB 12.2 09/15/2024    HCT 37.2 09/15/2024    MCV 98.7 (H) 09/15/2024     09/15/2024 " "    Lab Results   Component Value Date    GLUCOSE 100 (H) 09/15/2024    BUN 21 09/15/2024    CO2 23.4 09/15/2024    CREATININE 0.84 09/15/2024    K 3.9 09/15/2024     09/15/2024     09/15/2024    CALCIUM 9.2 09/15/2024     Lab Results   Component Value Date    MG 2.2 09/15/2024     No results found for: \"PHOS\"  Lab Results   Component Value Date    ALT 20 09/15/2024    AST 21 09/15/2024    ALKPHOS 82 09/15/2024    BILITOT 0.7 09/15/2024     Lab Results   Component Value Date    TRIG 81 2020    HDL 49 (L) 2020    LDL 84 2020     No results found for: \"PTT\", \"INR\"  Lab Results   Component Value Date    HGBA1C 5.8 (H) 2023     Lab Results   Component Value Date    TSH 0.032 (L) 09/15/2024        Investigations (Reviewed by provider)    EKG 10/23/24:   ECG 12 Lead    Date/Time: 10/23/2024 10:41 AM  Performed by: César Gregg MD    Authorized by: César Gregg MD  Comparison: compared with previous ECG from 2024  Comparison to previous ECG: Rate has slowed.  Otherwise unchanged.  Rhythm: atrial fibrillation         ECHOCARDIOGRAPHY: 2021:  Left ventricular ejection fraction appears to be 61 - 65%. Left ventricular systolic function is normal.  Left ventricular wall thickness is consistent with mild concentric hypertrophy  Left ventricular diastolic function is consistent with (grade I) impaired relaxation.  Normal right ventricular cavity size and systolic function noted.  The aortic valve leaflets are moderately calcified (aortic sclerosis)  There is no evidence of pericardial effusion        HOLTER/EVENT MONITORIN2024:  Monitor placed on patient by [facility] on 2024  at 14:23 EDT. Instructions were provided to patient on use of holter monitor and duration of monitoring.  The monitor was scanned on 2024. The patient was monitored for 14 days. Indications for this exam include PAROXYSMAL ATRIAL FIBRILLATION. Average HR:  85. Min HR:  48. Max HR:  197.      Study " Findings     Patient diary submitted. Palpitations was reported during the monitoring period. Palpitations correlated with episodes of premature atrial contractions, premature ventricular contractions and atrial fibrillation. Patient reported frequent episodes of palpitations. No complications noted. The predominant rhythm noted during the testing period was sinus rhythm. Premature atrial contractions occured occasionally. Evidence of atrial fibrillation was noted. Sinoatrial node conduction was normal. No atrioventricular block noted.      Study Impressions     An abnormal monitor study.      40% afib burden.                           Assessment & Plan      #A-fib, paroxysmal, symptomatic  - [id number] Score: 6  -Currently rate controlled on digoxin and atenolol.  - Overall, it seems like her symptoms have improved on rate control.  - Currently it is unclear to me if she is still paroxysmal or if she has become persistent.  She notes her Apple Watch is only alerting her and intermittently to A-fib.  - I would like to send another monitor to see if she is persistent.  I would also like to see what her baseline heart rates are.  This information will help us decide if cardioversion would be beneficial to her.  If she does ultimately undergo cardioversion, she likely would need amiodarone to keep her in sinus rhythm.  As she is currently seeing endocrinology who is assessing her for thyroid dysfunction, it would be helpful to receive their final assessment prior to starting amiodarone.  - This was all communicated to the patient and her family member who are in agreement.  -Today lets check digoxin levels.  - Continue her medical management for now.  -Continue anticoagulation.     Follow up in 2 months   Plan: Repeat ZIO to evaluate paroxysmal versus persistent A-fib.  Check digoxin levels.  Await evaluation from endocrinology regarding her thyroid function.     Thank you for the kind referral and allowing me to  participate in the care of your patient. We will continue to follow the patient in our clinic.     This time spent caring for Mellisa Sheldon on this date of service includes time spent by me in the following activities:preparing for the visit, reviewing tests, obtaining and/or reviewing a separately obtained history, performing a medically appropriate examination and/or evaluation, counseling and educating the patient/family/caregiver, documenting information in the medical record, and independently interpreting results and communicating that information with the patient/family/caregiver    César Gregg MD  Clinical Cardiac Electrophysiology  [facility]

## 2024-10-24 ENCOUNTER — DOCUMENTATION (OUTPATIENT)
Age: 84
End: 2024-10-24
Payer: MEDICARE

## 2024-10-24 NOTE — PROGRESS NOTES
See her digoxin level back.  1.3.  Very slightly high.  I called patient on 10/24/2024 at 9:20 AM.  And requested that she reduce her digoxin dosage slightly.  I suggested instead of taking it every day, just take it 5 days a week (skip Wednesday, skip Saturday).  I discussed that this will likely get her and that perfect therapeutic level.  Patient voiced understanding.

## 2024-11-11 RX ORDER — ISOSORBIDE MONONITRATE 30 MG/1
30 TABLET, EXTENDED RELEASE ORAL DAILY
Qty: 90 TABLET | Refills: 1 | Status: SHIPPED | OUTPATIENT
Start: 2024-11-11

## 2024-11-12 ENCOUNTER — TELEPHONE (OUTPATIENT)
Age: 84
End: 2024-11-12

## 2024-11-12 NOTE — TELEPHONE ENCOUNTER
Discussed with patient her result of her monitor.  Her monitor now shows 100% A-fib burden.  Called patient, patient notes she is still symptomatic from atrial fibrillation, feels palpitations, irregular heartbeats.    I discussed with her the risks and benefits of doing an atrial fibrillation ablation procedure.  Including about a 2% risk of a complication such as heart attack/stroke/injury to the heart/lungs/blood vessel/esophagus.    I did discuss that the risk of this procedure does go up as an individual gets older, especially above the age of 85.    Patient voiced understanding of all of the above, will think about it and discuss the risk/benefit with her family.  If she does make a decision, she will call the clinic back to let us know.

## 2024-11-14 NOTE — TELEPHONE ENCOUNTER
I spoke with patient. She is going to talk with her daughter and return a call to me about when she would like to schedule.

## 2024-11-14 NOTE — TELEPHONE ENCOUNTER
PATIENT IS REQUESTING TO SCHEDULE A TIME FOR HER FAMILY TO COME AND SPEAK TO DR RAINE ARANDA ABOUT HER OPTIONS AND ASK QUESTIONS.

## 2024-11-15 NOTE — TELEPHONE ENCOUNTER
PT DAUGHTER WHO WORKS COMPLICATED HOURS IS AVAILABE 11.27 AND 11.29 - ONLY AVAILABILITY SHOWING IS 11.27 @ 1030 - CONFIRMING APPT IS OKAY, PT WILL CALL IF ANY ISSUES WITH TIMEFRAME AND DAUGHTER IF OKAY

## 2024-11-27 ENCOUNTER — OFFICE VISIT (OUTPATIENT)
Age: 84
End: 2024-11-27
Payer: MEDICARE

## 2024-11-27 VITALS
HEIGHT: 67 IN | SYSTOLIC BLOOD PRESSURE: 130 MMHG | HEART RATE: 79 BPM | OXYGEN SATURATION: 97 % | DIASTOLIC BLOOD PRESSURE: 80 MMHG | BODY MASS INDEX: 33.59 KG/M2 | WEIGHT: 214 LBS

## 2024-11-27 DIAGNOSIS — I48.19 PERSISTENT ATRIAL FIBRILLATION: Primary | ICD-10-CM

## 2024-11-27 PROCEDURE — 1159F MED LIST DOCD IN RCRD: CPT | Performed by: STUDENT IN AN ORGANIZED HEALTH CARE EDUCATION/TRAINING PROGRAM

## 2024-11-27 PROCEDURE — 93000 ELECTROCARDIOGRAM COMPLETE: CPT | Performed by: STUDENT IN AN ORGANIZED HEALTH CARE EDUCATION/TRAINING PROGRAM

## 2024-11-27 PROCEDURE — 1160F RVW MEDS BY RX/DR IN RCRD: CPT | Performed by: STUDENT IN AN ORGANIZED HEALTH CARE EDUCATION/TRAINING PROGRAM

## 2024-11-27 PROCEDURE — 99214 OFFICE O/P EST MOD 30 MIN: CPT | Performed by: STUDENT IN AN ORGANIZED HEALTH CARE EDUCATION/TRAINING PROGRAM

## 2024-11-27 PROCEDURE — 3079F DIAST BP 80-89 MM HG: CPT | Performed by: STUDENT IN AN ORGANIZED HEALTH CARE EDUCATION/TRAINING PROGRAM

## 2024-11-27 PROCEDURE — 3075F SYST BP GE 130 - 139MM HG: CPT | Performed by: STUDENT IN AN ORGANIZED HEALTH CARE EDUCATION/TRAINING PROGRAM

## 2024-11-27 NOTE — PROGRESS NOTES
[facility]  Clinical Cardiac Electrophysiology     Date of Office Visit: 2024  Encounter Provider: César Gregg MD  Patient Name: Mellisa Sheldon  :1940    History of Presenting Illness     OFFICE VISIT  Mellisa Sheldon is a 83 y.o. female female with a past medical history of CAD with an anterior wall MI in  with PCI of the LAD followed by PCI of the RCA in 2012. Her she has ischemic cardiomyopathy with recovered EF, atrial fibrillation s/p cardioversion  who presents for follow-up evaluation.     She had hx of severe epistaxis about 3 years ago with Eliquis s/p cauterization, however she still continued nosebleeds on Eliquis.     In 2024, she developed palpitations.     Patient notes that she for the past several months (since about August), she has had some gradual slowing, increased dyspnea on exertion, and episodes of severe palpitations that corresponds with A-fib on her Apple Watch.  She notes that these episodes are bothersome and they have limited her ability to exercise/function.  She states that routinely before this, she was able to go up and down the stairs in the basement without any issue, however now she has to stop and rest at the top of the stairs.     At this visit, she was restarted on full dose Eliquis in preparation of possible cardioversion if she tolerates Eliquis well.        10/23/2024 : She presents with her daughter.     She notes that for the past 2-3 weeks she is more active/functional than she previously was.  However she does note that she feels some tiredness compared to prior.  She says her watch for many days will not notify her of any A-fib, but yesterday did notify her that she had 3 episodes of atrial fibrillation.  She does not know her rate during these A-fib episodes.  Today she is in A-fib, heart rate in the 80s.     Otherwise she knows she is at baseline.  She does note that she wishes to be more active and have more energy.      She notes she has recently seen endocrinology with Bryson Bearden on 9/20/2024.  She recently had labs drawn at [facility] on 10/18/2024 including thyroid levels.  She notes she has reached out to her endocrinologist office and is waiting to hear back from them regarding their assessment of her most recent labs.       11/27/2024 : Today she presents with 2 daughters.  She notes that she continues to feel poorly because of the atrial fibrillation.  We discussed that she has now 100% atrial fibrillation, and she still feels very symptomatic, decreased energy, increased shortness of breath on exertion.        Past Medical History     Past Medical History:   Diagnosis Date    GERD (gastroesophageal reflux disease)     Hyperlipidemia     Ischemic cardiomyopathy     Myocardial infarction     AIDA (obstructive sleep apnea) 07/14/2022    Home sleep study.  Weight 222 pounds.  Mild AIDA with AHI 10 events per hour.  No sleep-related hypoxia.     Past Surgical History:   Procedure Laterality Date    CORONARY ANGIOPLASTY      angioplasties of the LAD and the right coronary artery both with stent placements         Medications     Current Outpatient Medications on File Prior to Visit   Medication Sig Dispense Refill    apixaban (ELIQUIS) 5 MG tablet tablet Take 1 tablet by mouth 2 (Two) Times a Day. 60 tablet 3    aspirin 81 MG EC tablet Take 1 tablet by mouth Daily.      atenolol (TENORMIN) 25 MG tablet Take 1 tablet by mouth 2 (Two) Times a Day. 30 tablet 11    atorvastatin (LIPITOR) 20 MG tablet TAKE 1 TABLET BY MOUTH DAILY 90 tablet 1    digoxin (LANOXIN) 125 MCG tablet Take 1 tablet by mouth Daily. (Patient taking differently: Take 1 tablet by mouth Daily. 5 Times a week.) 90 tablet 3    isosorbide mononitrate (IMDUR) 30 MG 24 hr tablet Take 1 tablet by mouth Daily. 90 tablet 1    levothyroxine (SYNTHROID, LEVOTHROID) 25 MCG tablet Take 0.5 tablets by mouth Daily. 30 tablet 0    Multiple Vitamins-Minerals (PRESERVISION AREDS  "PO) Take  by mouth.      Omega-3 Fatty Acids (FISH OIL) 1000 MG capsule capsule Take 2 capsules by mouth Daily With Breakfast.      pantoprazole (PROTONIX) 40 MG EC tablet TAKE ONE TABLET BY MOUTH DAILY      Pyridoxine HCl (VITAMIN B-6 PO) Take by mouth daily.      VITAMIN D PO Take  by mouth Daily.       No current facility-administered medications on file prior to visit.           Allergies     Allergies   Allergen Reactions    Diclofenac Sodium Unknown - Low Severity    Celecoxib Itching    Ibuprofen Itching    Niacin Other (See Comments)     unknown    Nsaids Itching           Family History     Family History   Problem Relation Age of Onset    Cancer Mother     Dementia Sister            Social History     Social History     Socioeconomic History    Marital status:    Tobacco Use    Smoking status: Former     Current packs/day: 0.00     Average packs/day: 2.0 packs/day for 40.0 years (80.0 ttl pk-yrs)     Types: Cigarettes     Start date: 10/14/1969     Quit date: 10/14/2009     Years since quitting: 15.1    Smokeless tobacco: Never    Tobacco comments:     caffeine use : 2 cups daily.   Vaping Use    Vaping status: Never Used   Substance and Sexual Activity    Alcohol use: No    Drug use: No    Sexual activity: Not Currently        Review of Systems: All systems reviewed. Pertinent positives identified in HPI. All other systems are negative.         Physical Examination     Vitals:    11/27/24 1031   BP: 130/80   Pulse: 79   SpO2: 97%   Weight: 97.1 kg (214 lb)   Height: 170.2 cm (67\")     Body mass index is 33.52 kg/m².     Gen: Well nourished; Alert  Skin: no visible rashes and no abnormalities with palpation  Eyes: no evidence of visual defects and normal sclera  Ears: no abnormalities.  Mouth: normal teeth and appropriately moist mucous membranes  Chest: clear to auscultation. There is normal respiratory effort and expansion.  CV: examination showed a irregular rhythm. S1 and S2 were normal.   Abd: " "no visible distension.   Neurologic:Cranial nerves appear intact; Sensation normal; no localizing signs        Laboratory Studies (Reviewed by provider)    Lab Results   Component Value Date    WBC 5.46 09/15/2024    HGB 12.2 09/15/2024    HCT 37.2 09/15/2024    MCV 98.7 (H) 09/15/2024     09/15/2024     Lab Results   Component Value Date    GLUCOSE 100 (H) 09/15/2024    BUN 21 09/15/2024    CO2 23.4 09/15/2024    CREATININE 0.84 09/15/2024    K 3.9 09/15/2024     09/15/2024     09/15/2024    CALCIUM 9.2 09/15/2024     Lab Results   Component Value Date    MG 2.2 09/15/2024     No results found for: \"PHOS\"  Lab Results   Component Value Date    ALT 20 09/15/2024    AST 21 09/15/2024    ALKPHOS 82 09/15/2024    BILITOT 0.7 09/15/2024     Lab Results   Component Value Date    TRIG 81 06/24/2020    HDL 49 (L) 06/24/2020    LDL 84 06/24/2020     No results found for: \"PTT\", \"INR\"  Lab Results   Component Value Date    HGBA1C 5.8 (H) 11/08/2023     Lab Results   Component Value Date    TSH 0.032 (L) 09/15/2024        Investigations (Reviewed by provider)    EKG 11/27/24:   ECG 12 Lead    Date/Time: 11/27/2024 10:44 AM  Performed by: César Gregg MD    Authorized by: César Gregg MD  Comparison: compared with previous ECG   Rhythm: atrial fibrillation  QRS axis: left         EKG 10/23/24:   ECG 12 Lead     Date/Time: 10/23/2024 10:41 AM  Performed by: César Gregg MD     Authorized by: César Gregg MD  Comparison: compared with previous ECG from 9/24/2024  Comparison to previous ECG: Rate has slowed.  Otherwise unchanged.  Rhythm: atrial fibrillation           ECHOCARDIOGRAPHY: 11/12/2021:  Left ventricular ejection fraction appears to be 61 - 65%. Left ventricular systolic function is normal.  Left ventricular wall thickness is consistent with mild concentric hypertrophy  Left ventricular diastolic function is consistent with (grade I) impaired relaxation.  Normal right ventricular cavity size and " systolic function noted.  The aortic valve leaflets are moderately calcified (aortic sclerosis)  There is no evidence of pericardial effusion        HOLTER/EVENT MONITORIN2024:  Monitor placed on patient by [facility] on 2024  at 14:23 EDT. Instructions were provided to patient on use of holter monitor and duration of monitoring.  The monitor was scanned on 2024. The patient was monitored for 14 days. Indications for this exam include PAROXYSMAL ATRIAL FIBRILLATION. Average HR:  85. Min HR:  48. Max HR:  197.      Study Findings     Patient diary submitted. Palpitations was reported during the monitoring period. Palpitations correlated with episodes of premature atrial contractions, premature ventricular contractions and atrial fibrillation. Patient reported frequent episodes of palpitations. No complications noted. The predominant rhythm noted during the testing period was sinus rhythm. Premature atrial contractions occured occasionally. Evidence of atrial fibrillation was noted. Sinoatrial node conduction was normal. No atrioventricular block noted.      Study Impressions     An abnormal monitor study.      40% afib burden.                     Subsequent event monitor on 10/23/2024:    An abnormal monitor study.    Patient noted to be in atrial fibrillation 100% of the time.  Rate ranging from 45- 166 bpm (avg of 83 bpm     Atrial Fibrillation occurred continuously (100% burden), ranging from  bpm (avg of 83 bpm). Isolated VEs were rare (<1.0%), VE Couplets were rare (<1.0%), and no VE Triplets were present.          Assessment & Plan    #A-fib, now persistent highly symptomatic with shortness of breath, decreased exercise tolerance, dyspnea on exertion  -- [id number] Score: 6  - Anticoagulation with: Eliquis  - Currently rate controlled on digoxin and atenolol.  - I discussed various management strategies for her atrial fibrillation including medical management and ablation.  - Discussed with  patient risk/benefit of increased medical management versus atrial fibrillation ablation.  Described the risk of A-fib ablation, including approximately <2% risk of heart attack, stroke, injury to the heart/lungs/esophagus/blood vessels. Described that the risk of severe injury/illness/death due to the procedure is very small.  - Utilizing shared decision making with family and patient, patient elects to proceed with elective atrial fibrillation ablation.  - I did advise her to hold her digoxin for 48 hours prior to her scheduled procedure date.         Follow up in 1 months post-procedure  Plan: patient elects to proceed with elective atrial fibrillation ablation.     Thank you for the kind referral and allowing me to participate in the care of your patient. We will continue to follow the patient in our clinic.     This time spent caring for Mellisa Sheldon on this date of service includes time spent by me in the following activities:preparing for the visit, reviewing tests, obtaining and/or reviewing a separately obtained history, performing a medically appropriate examination and/or evaluation, counseling and educating the patient/family/caregiver, documenting information in the medical record, and independently interpreting results and communicating that information with the patient/family/caregiver    César Gregg MD  Clinical Cardiac Electrophysiology  [facility]

## 2025-01-11 ENCOUNTER — PATIENT MESSAGE (OUTPATIENT)
Age: 85
End: 2025-01-11
Payer: MEDICARE

## 2025-01-13 NOTE — TELEPHONE ENCOUNTER
Thank you for this update.  Please let her know that we can proceed with her A-fib ablation as planned.  Thanks   EMT/paramedic

## 2025-02-03 RX ORDER — ATORVASTATIN CALCIUM 20 MG/1
20 TABLET, FILM COATED ORAL DAILY
Qty: 90 TABLET | Refills: 1 | Status: SHIPPED | OUTPATIENT
Start: 2025-02-03

## 2025-02-06 ENCOUNTER — LAB (OUTPATIENT)
Dept: LAB | Facility: HOSPITAL | Age: 85
End: 2025-02-06
Payer: MEDICARE

## 2025-02-06 DIAGNOSIS — I48.19 PERSISTENT ATRIAL FIBRILLATION: ICD-10-CM

## 2025-02-06 LAB
ALBUMIN SERPL-MCNC: 4 G/DL (ref 3.5–5.2)
ALBUMIN/GLOB SERPL: 1.4 G/DL
ALP SERPL-CCNC: 109 U/L (ref 39–117)
ALT SERPL W P-5'-P-CCNC: 21 U/L (ref 1–33)
ANION GAP SERPL CALCULATED.3IONS-SCNC: 10 MMOL/L (ref 5–15)
AST SERPL-CCNC: 25 U/L (ref 1–32)
BASOPHILS # BLD AUTO: 0.03 10*3/MM3 (ref 0–0.2)
BASOPHILS NFR BLD AUTO: 0.4 % (ref 0–1.5)
BILIRUB SERPL-MCNC: 0.6 MG/DL (ref 0–1.2)
BUN SERPL-MCNC: 12 MG/DL (ref 8–23)
BUN/CREAT SERPL: 14.5 (ref 7–25)
CALCIUM SPEC-SCNC: 9.7 MG/DL (ref 8.6–10.5)
CHLORIDE SERPL-SCNC: 104 MMOL/L (ref 98–107)
CO2 SERPL-SCNC: 26 MMOL/L (ref 22–29)
CREAT SERPL-MCNC: 0.83 MG/DL (ref 0.57–1)
DEPRECATED RDW RBC AUTO: 46.6 FL (ref 37–54)
EGFRCR SERPLBLD CKD-EPI 2021: 69.2 ML/MIN/1.73
EOSINOPHIL # BLD AUTO: 0.17 10*3/MM3 (ref 0–0.4)
EOSINOPHIL NFR BLD AUTO: 2.1 % (ref 0.3–6.2)
ERYTHROCYTE [DISTWIDTH] IN BLOOD BY AUTOMATED COUNT: 13.1 % (ref 12.3–15.4)
GLOBULIN UR ELPH-MCNC: 2.9 GM/DL
GLUCOSE SERPL-MCNC: 100 MG/DL (ref 65–99)
HCT VFR BLD AUTO: 38.2 % (ref 34–46.6)
HGB BLD-MCNC: 11.8 G/DL (ref 12–15.9)
IMM GRANULOCYTES # BLD AUTO: 0.03 10*3/MM3 (ref 0–0.05)
IMM GRANULOCYTES NFR BLD AUTO: 0.4 % (ref 0–0.5)
LYMPHOCYTES # BLD AUTO: 1.59 10*3/MM3 (ref 0.7–3.1)
LYMPHOCYTES NFR BLD AUTO: 19.6 % (ref 19.6–45.3)
MCH RBC QN AUTO: 30 PG (ref 26.6–33)
MCHC RBC AUTO-ENTMCNC: 30.9 G/DL (ref 31.5–35.7)
MCV RBC AUTO: 97.2 FL (ref 79–97)
MONOCYTES # BLD AUTO: 0.51 10*3/MM3 (ref 0.1–0.9)
MONOCYTES NFR BLD AUTO: 6.3 % (ref 5–12)
NEUTROPHILS NFR BLD AUTO: 5.77 10*3/MM3 (ref 1.7–7)
NEUTROPHILS NFR BLD AUTO: 71.2 % (ref 42.7–76)
NRBC BLD AUTO-RTO: 0 /100 WBC (ref 0–0.2)
PLATELET # BLD AUTO: 207 10*3/MM3 (ref 140–450)
PMV BLD AUTO: 11.5 FL (ref 6–12)
POTASSIUM SERPL-SCNC: 3.9 MMOL/L (ref 3.5–5.2)
PROT SERPL-MCNC: 6.9 G/DL (ref 6–8.5)
RBC # BLD AUTO: 3.93 10*6/MM3 (ref 3.77–5.28)
SODIUM SERPL-SCNC: 140 MMOL/L (ref 136–145)
WBC NRBC COR # BLD AUTO: 8.1 10*3/MM3 (ref 3.4–10.8)

## 2025-02-06 PROCEDURE — 80053 COMPREHEN METABOLIC PANEL: CPT

## 2025-02-06 PROCEDURE — 85025 COMPLETE CBC W/AUTO DIFF WBC: CPT

## 2025-02-06 PROCEDURE — 36415 COLL VENOUS BLD VENIPUNCTURE: CPT

## 2025-02-07 ENCOUNTER — PATIENT MESSAGE (OUTPATIENT)
Age: 85
End: 2025-02-07
Payer: MEDICARE

## 2025-02-10 RX ORDER — APIXABAN 5 MG/1
5 TABLET, FILM COATED ORAL 2 TIMES DAILY
Qty: 60 TABLET | Refills: 3 | Status: SHIPPED | OUTPATIENT
Start: 2025-02-10

## 2025-02-10 NOTE — TELEPHONE ENCOUNTER
Her blood cell counts are very slightly low, but this is not uncommon for someone her age.  No impact on surgery, we can proceed as scheduled.  Thanks much

## 2025-02-13 ENCOUNTER — HOSPITAL ENCOUNTER (OUTPATIENT)
Facility: HOSPITAL | Age: 85
Setting detail: HOSPITAL OUTPATIENT SURGERY
Discharge: HOME | End: 2025-02-13
Attending: STUDENT IN AN ORGANIZED HEALTH CARE EDUCATION/TRAINING PROGRAM | Admitting: STUDENT IN AN ORGANIZED HEALTH CARE EDUCATION/TRAINING PROGRAM
Payer: MEDICARE

## 2025-02-13 ENCOUNTER — ANESTHESIA EVENT (OUTPATIENT)
Dept: CARDIOLOGY | Facility: HOSPITAL | Age: 85
End: 2025-02-13
Payer: MEDICARE

## 2025-02-13 ENCOUNTER — ANESTHESIA (OUTPATIENT)
Dept: CARDIOLOGY | Facility: HOSPITAL | Age: 85
End: 2025-02-13
Payer: MEDICARE

## 2025-02-13 VITALS
DIASTOLIC BLOOD PRESSURE: 63 MMHG | WEIGHT: 198.5 LBS | RESPIRATION RATE: 20 BRPM | TEMPERATURE: 97.6 F | SYSTOLIC BLOOD PRESSURE: 132 MMHG | HEIGHT: 68 IN | HEART RATE: 72 BPM | BODY MASS INDEX: 30.08 KG/M2 | OXYGEN SATURATION: 97 %

## 2025-02-13 DIAGNOSIS — I48.19 PERSISTENT ATRIAL FIBRILLATION: ICD-10-CM

## 2025-02-13 LAB
ACT BLD: 285 SECONDS (ref 82–152)
ACT BLD: 331 SECONDS (ref 82–152)
QT INTERVAL: 328 MS
QT INTERVAL: 398 MS
QTC INTERVAL: 424 MS
QTC INTERVAL: 429 MS

## 2025-02-13 PROCEDURE — 25010000002 ONDANSETRON PER 1 MG: Performed by: NURSE ANESTHETIST, CERTIFIED REGISTERED

## 2025-02-13 PROCEDURE — 93656 COMPRE EP EVAL ABLTJ ATR FIB: CPT | Performed by: STUDENT IN AN ORGANIZED HEALTH CARE EDUCATION/TRAINING PROGRAM

## 2025-02-13 PROCEDURE — C1732 CATH, EP, DIAG/ABL, 3D/VECT: HCPCS | Performed by: STUDENT IN AN ORGANIZED HEALTH CARE EDUCATION/TRAINING PROGRAM

## 2025-02-13 PROCEDURE — 25010000002 LIDOCAINE-EPINEPHRINE (PF) 1 %-1:200000 SOLUTION: Performed by: STUDENT IN AN ORGANIZED HEALTH CARE EDUCATION/TRAINING PROGRAM

## 2025-02-13 PROCEDURE — 93005 ELECTROCARDIOGRAM TRACING: CPT | Performed by: STUDENT IN AN ORGANIZED HEALTH CARE EDUCATION/TRAINING PROGRAM

## 2025-02-13 PROCEDURE — S0260 H&P FOR SURGERY: HCPCS | Performed by: STUDENT IN AN ORGANIZED HEALTH CARE EDUCATION/TRAINING PROGRAM

## 2025-02-13 PROCEDURE — C1894 INTRO/SHEATH, NON-LASER: HCPCS | Performed by: STUDENT IN AN ORGANIZED HEALTH CARE EDUCATION/TRAINING PROGRAM

## 2025-02-13 PROCEDURE — 25010000002 SUGAMMADEX 200 MG/2ML SOLUTION: Performed by: NURSE ANESTHETIST, CERTIFIED REGISTERED

## 2025-02-13 PROCEDURE — C1893 INTRO/SHEATH, FIXED,NON-PEEL: HCPCS | Performed by: STUDENT IN AN ORGANIZED HEALTH CARE EDUCATION/TRAINING PROGRAM

## 2025-02-13 PROCEDURE — 25010000002 PHENYLEPHRINE 10 MG/ML SOLUTION: Performed by: NURSE ANESTHETIST, CERTIFIED REGISTERED

## 2025-02-13 PROCEDURE — 25010000002 PROPOFOL 10 MG/ML EMULSION: Performed by: NURSE ANESTHETIST, CERTIFIED REGISTERED

## 2025-02-13 PROCEDURE — C1759 CATH, INTRA ECHOCARDIOGRAPHY: HCPCS | Performed by: STUDENT IN AN ORGANIZED HEALTH CARE EDUCATION/TRAINING PROGRAM

## 2025-02-13 PROCEDURE — C1760 CLOSURE DEV, VASC: HCPCS | Performed by: STUDENT IN AN ORGANIZED HEALTH CARE EDUCATION/TRAINING PROGRAM

## 2025-02-13 PROCEDURE — 93657 TX L/R ATRIAL FIB ADDL: CPT | Performed by: STUDENT IN AN ORGANIZED HEALTH CARE EDUCATION/TRAINING PROGRAM

## 2025-02-13 PROCEDURE — 25010000002 HEPARIN (PORCINE) PER 1000 UNITS: Performed by: STUDENT IN AN ORGANIZED HEALTH CARE EDUCATION/TRAINING PROGRAM

## 2025-02-13 PROCEDURE — 25010000002 PROTAMINE SULFATE PER 10 MG: Performed by: STUDENT IN AN ORGANIZED HEALTH CARE EDUCATION/TRAINING PROGRAM

## 2025-02-13 PROCEDURE — C1766 INTRO/SHEATH,STRBLE,NON-PEEL: HCPCS | Performed by: STUDENT IN AN ORGANIZED HEALTH CARE EDUCATION/TRAINING PROGRAM

## 2025-02-13 PROCEDURE — 85347 COAGULATION TIME ACTIVATED: CPT

## 2025-02-13 PROCEDURE — 25010000002 LIDOCAINE 2% SOLUTION: Performed by: NURSE ANESTHETIST, CERTIFIED REGISTERED

## 2025-02-13 PROCEDURE — 93010 ELECTROCARDIOGRAM REPORT: CPT | Performed by: INTERNAL MEDICINE

## 2025-02-13 PROCEDURE — 25810000003 SODIUM CHLORIDE 0.9 % SOLUTION: Performed by: STUDENT IN AN ORGANIZED HEALTH CARE EDUCATION/TRAINING PROGRAM

## 2025-02-13 RX ORDER — HYDRALAZINE HYDROCHLORIDE 20 MG/ML
5 INJECTION INTRAMUSCULAR; INTRAVENOUS
Status: DISCONTINUED | OUTPATIENT
Start: 2025-02-13 | End: 2025-02-13 | Stop reason: HOSPADM

## 2025-02-13 RX ORDER — LIDOCAINE HYDROCHLORIDE 10 MG/ML
0.5 INJECTION, SOLUTION INFILTRATION; PERINEURAL ONCE AS NEEDED
Status: DISCONTINUED | OUTPATIENT
Start: 2025-02-13 | End: 2025-02-13 | Stop reason: HOSPADM

## 2025-02-13 RX ORDER — ONDANSETRON 2 MG/ML
INJECTION INTRAMUSCULAR; INTRAVENOUS AS NEEDED
Status: DISCONTINUED | OUTPATIENT
Start: 2025-02-13 | End: 2025-02-13 | Stop reason: SURG

## 2025-02-13 RX ORDER — HEPARIN SODIUM 1000 [USP'U]/ML
INJECTION, SOLUTION INTRAVENOUS; SUBCUTANEOUS
Status: DISCONTINUED | OUTPATIENT
Start: 2025-02-13 | End: 2025-02-13 | Stop reason: HOSPADM

## 2025-02-13 RX ORDER — SODIUM CHLORIDE, SODIUM LACTATE, POTASSIUM CHLORIDE, CALCIUM CHLORIDE 600; 310; 30; 20 MG/100ML; MG/100ML; MG/100ML; MG/100ML
9 INJECTION, SOLUTION INTRAVENOUS CONTINUOUS
Status: DISCONTINUED | OUTPATIENT
Start: 2025-02-13 | End: 2025-02-13 | Stop reason: HOSPADM

## 2025-02-13 RX ORDER — PROTAMINE SULFATE 10 MG/ML
INJECTION, SOLUTION INTRAVENOUS
Status: DISCONTINUED | OUTPATIENT
Start: 2025-02-13 | End: 2025-02-13 | Stop reason: HOSPADM

## 2025-02-13 RX ORDER — HALOPERIDOL 5 MG/ML
2 INJECTION INTRAMUSCULAR EVERY 4 HOURS PRN
Status: DISCONTINUED | OUTPATIENT
Start: 2025-02-13 | End: 2025-02-13 | Stop reason: HOSPADM

## 2025-02-13 RX ORDER — SODIUM CHLORIDE 9 MG/ML
75 INJECTION, SOLUTION INTRAVENOUS CONTINUOUS
Status: DISCONTINUED | OUTPATIENT
Start: 2025-02-13 | End: 2025-02-13 | Stop reason: HOSPADM

## 2025-02-13 RX ORDER — PROPOFOL 10 MG/ML
VIAL (ML) INTRAVENOUS AS NEEDED
Status: DISCONTINUED | OUTPATIENT
Start: 2025-02-13 | End: 2025-02-13 | Stop reason: SURG

## 2025-02-13 RX ORDER — FENTANYL CITRATE 50 UG/ML
50 INJECTION, SOLUTION INTRAMUSCULAR; INTRAVENOUS ONCE AS NEEDED
Status: DISCONTINUED | OUTPATIENT
Start: 2025-02-13 | End: 2025-02-13 | Stop reason: HOSPADM

## 2025-02-13 RX ORDER — HYDROCODONE BITARTRATE AND ACETAMINOPHEN 5; 325 MG/1; MG/1
1 TABLET ORAL ONCE AS NEEDED
Status: DISCONTINUED | OUTPATIENT
Start: 2025-02-13 | End: 2025-02-13 | Stop reason: HOSPADM

## 2025-02-13 RX ORDER — LABETALOL HYDROCHLORIDE 5 MG/ML
5 INJECTION, SOLUTION INTRAVENOUS
Status: DISCONTINUED | OUTPATIENT
Start: 2025-02-13 | End: 2025-02-13 | Stop reason: HOSPADM

## 2025-02-13 RX ORDER — ONDANSETRON 2 MG/ML
4 INJECTION INTRAMUSCULAR; INTRAVENOUS EVERY 6 HOURS PRN
Status: DISCONTINUED | OUTPATIENT
Start: 2025-02-13 | End: 2025-02-13 | Stop reason: HOSPADM

## 2025-02-13 RX ORDER — PROMETHAZINE HYDROCHLORIDE 12.5 MG/1
25 TABLET ORAL ONCE AS NEEDED
Status: DISCONTINUED | OUTPATIENT
Start: 2025-02-13 | End: 2025-02-13 | Stop reason: HOSPADM

## 2025-02-13 RX ORDER — SODIUM CHLORIDE 0.9 % (FLUSH) 0.9 %
3-10 SYRINGE (ML) INJECTION AS NEEDED
Status: DISCONTINUED | OUTPATIENT
Start: 2025-02-13 | End: 2025-02-13 | Stop reason: HOSPADM

## 2025-02-13 RX ORDER — IPRATROPIUM BROMIDE AND ALBUTEROL SULFATE 2.5; .5 MG/3ML; MG/3ML
3 SOLUTION RESPIRATORY (INHALATION) ONCE AS NEEDED
Status: DISCONTINUED | OUTPATIENT
Start: 2025-02-13 | End: 2025-02-13 | Stop reason: HOSPADM

## 2025-02-13 RX ORDER — ONDANSETRON 2 MG/ML
4 INJECTION INTRAMUSCULAR; INTRAVENOUS ONCE AS NEEDED
Status: DISCONTINUED | OUTPATIENT
Start: 2025-02-13 | End: 2025-02-13 | Stop reason: HOSPADM

## 2025-02-13 RX ORDER — NITROGLYCERIN 0.4 MG/1
0.4 TABLET SUBLINGUAL
Status: DISCONTINUED | OUTPATIENT
Start: 2025-02-13 | End: 2025-02-13 | Stop reason: HOSPADM

## 2025-02-13 RX ORDER — PROMETHAZINE HYDROCHLORIDE 25 MG/1
25 SUPPOSITORY RECTAL ONCE AS NEEDED
Status: DISCONTINUED | OUTPATIENT
Start: 2025-02-13 | End: 2025-02-13 | Stop reason: HOSPADM

## 2025-02-13 RX ORDER — NALOXONE HCL 0.4 MG/ML
0.2 VIAL (ML) INJECTION AS NEEDED
Status: DISCONTINUED | OUTPATIENT
Start: 2025-02-13 | End: 2025-02-13 | Stop reason: HOSPADM

## 2025-02-13 RX ORDER — HYDROCODONE BITARTRATE AND ACETAMINOPHEN 7.5; 325 MG/1; MG/1
1 TABLET ORAL EVERY 4 HOURS PRN
Status: DISCONTINUED | OUTPATIENT
Start: 2025-02-13 | End: 2025-02-13 | Stop reason: HOSPADM

## 2025-02-13 RX ORDER — FLUMAZENIL 0.1 MG/ML
0.2 INJECTION INTRAVENOUS AS NEEDED
Status: DISCONTINUED | OUTPATIENT
Start: 2025-02-13 | End: 2025-02-13 | Stop reason: HOSPADM

## 2025-02-13 RX ORDER — ACETAMINOPHEN 650 MG/1
650 SUPPOSITORY RECTAL EVERY 4 HOURS PRN
Status: DISCONTINUED | OUTPATIENT
Start: 2025-02-13 | End: 2025-02-13 | Stop reason: HOSPADM

## 2025-02-13 RX ORDER — SODIUM CHLORIDE 0.9 % (FLUSH) 0.9 %
3 SYRINGE (ML) INJECTION EVERY 12 HOURS SCHEDULED
Status: DISCONTINUED | OUTPATIENT
Start: 2025-02-13 | End: 2025-02-13 | Stop reason: HOSPADM

## 2025-02-13 RX ORDER — ROCURONIUM BROMIDE 10 MG/ML
INJECTION, SOLUTION INTRAVENOUS AS NEEDED
Status: DISCONTINUED | OUTPATIENT
Start: 2025-02-13 | End: 2025-02-13 | Stop reason: SURG

## 2025-02-13 RX ORDER — PHENYLEPHRINE HYDROCHLORIDE 10 MG/ML
INJECTION INTRAVENOUS AS NEEDED
Status: DISCONTINUED | OUTPATIENT
Start: 2025-02-13 | End: 2025-02-13 | Stop reason: SURG

## 2025-02-13 RX ORDER — EPHEDRINE SULFATE 50 MG/ML
INJECTION, SOLUTION INTRAVENOUS AS NEEDED
Status: DISCONTINUED | OUTPATIENT
Start: 2025-02-13 | End: 2025-02-13 | Stop reason: SURG

## 2025-02-13 RX ORDER — ZIPRASIDONE MESYLATE 20 MG/ML
10 INJECTION, POWDER, LYOPHILIZED, FOR SOLUTION INTRAMUSCULAR EVERY 4 HOURS PRN
Status: DISCONTINUED | OUTPATIENT
Start: 2025-02-13 | End: 2025-02-13 | Stop reason: HOSPADM

## 2025-02-13 RX ORDER — ACETAMINOPHEN 325 MG/1
650 TABLET ORAL EVERY 4 HOURS PRN
Status: DISCONTINUED | OUTPATIENT
Start: 2025-02-13 | End: 2025-02-13 | Stop reason: HOSPADM

## 2025-02-13 RX ORDER — DIPHENHYDRAMINE HYDROCHLORIDE 50 MG/ML
12.5 INJECTION INTRAMUSCULAR; INTRAVENOUS
Status: DISCONTINUED | OUTPATIENT
Start: 2025-02-13 | End: 2025-02-13 | Stop reason: HOSPADM

## 2025-02-13 RX ORDER — LIDOCAINE HYDROCHLORIDE 20 MG/ML
INJECTION, SOLUTION INFILTRATION; PERINEURAL AS NEEDED
Status: DISCONTINUED | OUTPATIENT
Start: 2025-02-13 | End: 2025-02-13 | Stop reason: SURG

## 2025-02-13 RX ORDER — FENTANYL CITRATE 50 UG/ML
25 INJECTION, SOLUTION INTRAMUSCULAR; INTRAVENOUS
Status: DISCONTINUED | OUTPATIENT
Start: 2025-02-13 | End: 2025-02-13 | Stop reason: HOSPADM

## 2025-02-13 RX ORDER — ATROPINE SULFATE 0.4 MG/ML
0.4 INJECTION, SOLUTION INTRAMUSCULAR; INTRAVENOUS; SUBCUTANEOUS ONCE AS NEEDED
Status: DISCONTINUED | OUTPATIENT
Start: 2025-02-13 | End: 2025-02-13 | Stop reason: HOSPADM

## 2025-02-13 RX ORDER — HYDROMORPHONE HYDROCHLORIDE 1 MG/ML
0.25 INJECTION, SOLUTION INTRAMUSCULAR; INTRAVENOUS; SUBCUTANEOUS
Status: DISCONTINUED | OUTPATIENT
Start: 2025-02-13 | End: 2025-02-13 | Stop reason: HOSPADM

## 2025-02-13 RX ORDER — SODIUM CHLORIDE 0.9 % (FLUSH) 0.9 %
10 SYRINGE (ML) INJECTION AS NEEDED
Status: DISCONTINUED | OUTPATIENT
Start: 2025-02-13 | End: 2025-02-13 | Stop reason: HOSPADM

## 2025-02-13 RX ORDER — EPHEDRINE SULFATE 50 MG/ML
5 INJECTION, SOLUTION INTRAVENOUS ONCE AS NEEDED
Status: DISCONTINUED | OUTPATIENT
Start: 2025-02-13 | End: 2025-02-13 | Stop reason: HOSPADM

## 2025-02-13 RX ORDER — FAMOTIDINE 10 MG/ML
20 INJECTION, SOLUTION INTRAVENOUS ONCE
Status: COMPLETED | OUTPATIENT
Start: 2025-02-13 | End: 2025-02-13

## 2025-02-13 RX ADMIN — PHENYLEPHRINE HYDROCHLORIDE 100 MCG: 10 INJECTION INTRAVENOUS at 09:02

## 2025-02-13 RX ADMIN — ROCURONIUM BROMIDE 50 MG: 10 INJECTION INTRAVENOUS at 08:46

## 2025-02-13 RX ADMIN — PHENYLEPHRINE HYDROCHLORIDE 100 MCG: 10 INJECTION INTRAVENOUS at 09:05

## 2025-02-13 RX ADMIN — PROPOFOL 120 MG: 10 INJECTION, EMULSION INTRAVENOUS at 08:46

## 2025-02-13 RX ADMIN — SUGAMMADEX 200 MG: 100 INJECTION, SOLUTION INTRAVENOUS at 10:24

## 2025-02-13 RX ADMIN — LIDOCAINE HYDROCHLORIDE 80 MG: 20 INJECTION, SOLUTION INFILTRATION; PERINEURAL at 08:46

## 2025-02-13 RX ADMIN — PROPOFOL 20 MG: 10 INJECTION, EMULSION INTRAVENOUS at 09:48

## 2025-02-13 RX ADMIN — PHENYLEPHRINE HYDROCHLORIDE 100 MCG: 10 INJECTION INTRAVENOUS at 09:26

## 2025-02-13 RX ADMIN — FAMOTIDINE 20 MG: 10 INJECTION INTRAVENOUS at 08:29

## 2025-02-13 RX ADMIN — PHENYLEPHRINE HYDROCHLORIDE 200 MCG: 10 INJECTION INTRAVENOUS at 10:17

## 2025-02-13 RX ADMIN — EPHEDRINE SULFATE 10 MG: 50 INJECTION INTRAVENOUS at 10:17

## 2025-02-13 RX ADMIN — ONDANSETRON 4 MG: 2 INJECTION INTRAMUSCULAR; INTRAVENOUS at 10:21

## 2025-02-13 RX ADMIN — SODIUM CHLORIDE 75 ML/HR: 9 INJECTION, SOLUTION INTRAVENOUS at 08:04

## 2025-02-13 NOTE — ANESTHESIA PREPROCEDURE EVALUATION
Anesthesia Evaluation     NPO Solid Status: > 8 hours             Airway   Mallampati: II  Dental      Comment: Caps throughout.  Risks of dental damage discussed with patient.      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 and 2 or more mitigating factors used (recovery in non-supine position and multimodal analgesia)    Cardiovascular     (+) hypertension, past MI , CAD, cardiac stents more than 12 months ago , dysrhythmias Atrial Fib, hyperlipidemia      Neuro/Psych  GI/Hepatic/Renal/Endo    (+) obesity, GERD, thyroid problem hypothyroidism    Musculoskeletal     Abdominal    Substance History      OB/GYN          Other                    Anesthesia Plan    ASA 3     general       Anesthetic plan, risks, benefits, and alternatives have been provided, discussed and informed consent has been obtained with: patient.    CODE STATUS:

## 2025-02-13 NOTE — Clinical Note
EP Catheter removed.  Problem: Pain:  Goal: Pain level will decrease  Description: Pain level will decrease  4/3/2021 0635 by Xiomara Spears RN  Outcome: Met This Shift  4/3/2021 0634 by Xiomara Spears RN  Outcome: Met This Shift  Goal: Control of acute pain  Description: Control of acute pain  4/3/2021 0635 by Xiomara Spears RN  Outcome: Met This Shift  4/3/2021 0634 by Xiomara Spears RN  Outcome: Met This Shift  Goal: Control of chronic pain  Description: Control of chronic pain  4/3/2021 0635 by Xiomara Spears RN  Outcome: Met This Shift  4/3/2021 0634 by Xiomara Spears RN  Outcome: Met This Shift     Problem: Falls - Risk of:  Goal: Will remain free from falls  Description: Will remain free from falls  4/3/2021 0635 by Xiomara Spears RN  Outcome: Met This Shift  4/3/2021 0634 by Xiomara Spears RN  Outcome: Met This Shift  Goal: Absence of physical injury  Description: Absence of physical injury  4/3/2021 0635 by Xiomara Spears RN  Outcome: Met This Shift  4/3/2021 0634 by Xiomara Spears RN  Outcome: Met This Shift

## 2025-02-13 NOTE — ANESTHESIA PROCEDURE NOTES
Airway  Urgency: elective    Date/Time: 2/13/2025 8:49 AM  Airway not difficult    General Information and Staff    Patient location during procedure: OR  CRNA/CAA: Iva Montgomery CRNA    Indications and Patient Condition  Indications for airway management: airway protection    Preoxygenated: yes  Mask difficulty assessment: 1 - vent by mask    Final Airway Details  Final airway type: endotracheal airway      Successful airway: ETT  Cuffed: yes   Successful intubation technique: direct laryngoscopy  Facilitating devices/methods: intubating stylet and anterior pressure/BURP  Endotracheal tube insertion site: oral  Blade: Michele  Blade size: 2  ETT size (mm): 7.5  Cormack-Lehane Classification: grade I - full view of glottis  Placement verified by: chest auscultation and capnometry   Measured from: lips  ETT/EBT  to teeth (cm): 21  ETT/EBT  to lips (cm): 22  Number of attempts at approach: 1  Assessment: lips, teeth, and gum same as pre-op and atraumatic intubation

## 2025-02-13 NOTE — Clinical Note
Hemostasis started on the right femoral vein. Vascade MVP was used in achieving hemostasis. Closure device deployed in the vessel and manual pressure applied to vessel. Manual pressure was held by Kita. Manual pressure was held for 5 min. Hemostasis achieved successfully.

## 2025-02-13 NOTE — ANESTHESIA POSTPROCEDURE EVALUATION
"Patient: Mellisa Sheldon    Procedure Summary       Date: 02/13/25 Room / Location: [place] [id number] /  [facility]    Anesthesia Start: 0834 Anesthesia Stop: 1038    Procedures:       Ablation atrial fibrillation      3D Mapping EP Diagnosis:       Persistent atrial fibrillation      (atrial fibrillation)    Providers: César Gregg MD Provider: Ramos Maza MD    Anesthesia Type: general ASA Status: 3            Anesthesia Type: general    Vitals  Vitals Value Taken Time   /106 02/13/25 1200   Temp 36.4 °C (97.6 °F) 02/13/25 1040   Pulse 64 02/13/25 1207   Resp 20 02/13/25 1130   SpO2 100 % 02/13/25 1207   Vitals shown include unfiled device data.        Post Anesthesia Care and Evaluation    Pain management: adequate    Airway patency: patent  Anesthetic complications: No anesthetic complications    Cardiovascular status: acceptable  Respiratory status: acceptable  Hydration status: acceptable    Comments: /62   Pulse 69   Temp 36.4 °C (97.6 °F) (Oral)   Resp 20   Ht 172.7 cm (68\")   Wt 90 kg (198 lb 8 oz)   SpO2 100%   BMI 30.18 kg/m²       "

## 2025-02-13 NOTE — DISCHARGE INSTRUCTIONS
[facility]  [address] [place] [address]    Dr. Gregg's Discharge Instructions for Atrial Fibrillation or Atrial Flutter     Refer to this sheet in the next few weeks. These instructions provide you with information on caring for yourself after your procedure.     Make arrangements to have someone stay with you for 24 hours following the procedure.  This is due to the sedation you received and for your safety in the event of any complications.      About your Procedure:  You have had a procedure called a catheter ablation.  It is used to treat abnormal heartbeats (arrhythmia). The procedure destroyed (ablated) the cells in your heart that were causing your heart rhythm problem. During the procedure, the healthcare provider placed a thin, flexible wire (catheter) into a blood vessel in your groin.  The provider then threaded the catheter to your heart and destroyed the problematic cells.      Goal of Procedure:  The goal of this procedure is to regain a normal heart rhythm (sinus rhythm) and reduce the symptoms associated with your irregular heart rhythm. In many cases, one ablation is enough to treat the arrythmia, but sometimes the problem returns, and a second ablation may be necessary.      What to Expect After the Procedure:  After your procedure, it is typical to have the following sensations:  Minor discomfort or soreness in the chest or a small bump at the insertion site (groin). The bump should usually decrease in size and tenderness within 1 to 2 weeks.  Mild bruising which will usually fade within 2 to 4 weeks.  A mild sore throat  Sometimes the irregular heartbeat goes away immediately after the procedure.  Other times, it may take longer.  As your heart heals, it is common to have some Atrial Fibrillation symptoms and you may even go into atrial fibrillation up to a few months after the procedure.    Do not miss a dose of your blood thinner (Apixaban/Eliquis, rivaroxaban/Xarelto,  Warfarin/Coumadin).   You will have a follow up appointment in approximately 1 month.      Home Care Instructions:  Take your medications exactly as directed.  Do not skip doses unless directed to do so by your healthcare provider.   You should be able to return to most of your normal activities in the next 1-2 days. These include walking, climbing stairs, and doing household chores.   You may remove the dressings in your groin in 24 hours.    You may then shower and gently wash the area with plain soap and water after removing the dressings.     Do not apply powder or lotion to the site.  Do not take baths, swim, or use a hot tub until your health care provider approves and the site is completely healed.  Hold direct pressure to your groin sites any time you laugh, cough, sneeze or change positions.  Do this for the next 48 hours.   Do not bend, squat, or lift anything over 20 lb. (9 kg) for 7 days after your ablation. Do not do any other heavy physical activity for 7 days.  This allows your body time to heal properly.    Inspect the groin sites daily for signs of infection for 7 days. Those signs include: redness, swelling, drainage, or warmth at the groin sites.     Follow instructions about when you can drive or return to work as directed by your physician.    If you experience bleeding or swelling (larger than the size of a golf ball) at the groin sites after you go home, do not remove the dressing. Lie down flat and hold pressure over the sites for at last 10-15 minutes. You or the person with you should call the office at [phone] for further instructions. If the bleeding does not stop after 10-15 minutes, call [phone] and continue holding pressure.  You need to come to the emergency room for evaluation.     Call Your Doctor If:  You experience the symptoms you had before the procedure for more than 24 hours. You have drainage (other than a small amount of blood on the dressing).  You have chills or a fever >  101.  You have redness, warmth, swelling (larger than a walnut), drainage or severe pain at the insertion site  You develop chest pain or shortness of breath, feel faint, or pass out.  You develop pain, discoloration, coldness, numbness, tingling, or severe bruising in the leg that held the catheter.  You develop bleeding from any other place, such as the bowels.  You have difficulty swallowing, excessive pain when swallowing, difficulty breathing or vomiting of blood  You have unstable vital signs such as blood pressure less than 90/60 or a heart rate greater than 130 beats per minute for more than 1 hour.   You have any symptoms of a stroke.  Remember BE FAST  B-balance. Sudden trouble walking or loss of balance.  E-eyes.  Sudden changes in how you see or a sudden onset of a very bad headache.   F-face. Sudden weakness or loss of feeling of the face or facial droop on one side.   A-arms Sudden weakness or numbness in one arm.  One arm drifts down if they are both held out in front of you. This happens suddenly and usually on one side of the body.  S-speech.  Sudden trouble speaking, slurred speech or trouble understanding what people are saying.   T-time  Time to call emergency services.  Write down the symptoms and the time they started.

## 2025-02-13 NOTE — Clinical Note
The DP pulses are +1 bilaterally. The PT pulses are +1 bilaterally. No redness noted denise heels. Feet elevated on pillow. Cocoon warming blanket on at 43 degrees C.Circa temp probe used. Clear sight finger cuff used for hemodynamic monitoring.

## 2025-02-13 NOTE — H&P
History And Physical Assessment    Patient Name: Mellisa Sheldon  Age/Sex: 84 y.o. female  : 1940  MRN: [id number]    Date of Hospital Visit: 2025  Encounter Provider: César Gregg MD  Place of Service: [facility] CARDIOLOGY  Patient Care Team:  Marcial Kwon MD as PCP - General (Family Medicine)      Subjective:     Chief Complaint: Palpitations    84 y.o. female with a past medical history of CAD with an anterior wall MI in  with PCI of the LAD followed by PCI of the RCA in . Her she has ischemic cardiomyopathy with recovered EF, atrial fibrillation s/p cardioversion  who presents for follow-up evaluation.      She had hx of severe epistaxis about 3 years ago with Eliquis s/p cauterization, however she still continued nosebleeds on Eliquis.     In 2024, she developed palpitations.     Patient notes that she for the past several months (since about August), she has had some gradual slowing, increased dyspnea on exertion, and episodes of severe palpitations that corresponds with A-fib on her Apple Watch.  She notes that these episodes are bothersome and they have limited her ability to exercise/function.  She states that routinely before this, she was able to go up and down the stairs in the basement without any issue, however now she has to stop and rest at the top of the stairs.     At this visit, she was restarted on full dose Eliquis in preparation of possible cardioversion if she tolerates Eliquis well.        10/23/2024 : She presents with her daughter.     She notes that for the past 2-3 weeks she is more active/functional than she previously was.  However she does note that she feels some tiredness compared to prior.  She says her watch for many days will not notify her of any A-fib, but yesterday did notify her that she had 3 episodes of atrial fibrillation.  She does not know her rate during these A-fib episodes.  Today she is in A-fib,  heart rate in the 80s.     Otherwise she knows she is at baseline.  She does note that she wishes to be more active and have more energy.     She notes she has recently seen endocrinology with Bryson Bearden on 9/20/2024.  She recently had labs drawn at [facility] on 10/18/2024 including thyroid levels.  She notes she has reached out to her endocrinologist office and is waiting to hear back from them regarding their assessment of her most recent labs.         11/27/2024 : Today she presents with 2 daughters.  She notes that she continues to feel poorly because of the atrial fibrillation.  We discussed that she has now 100% atrial fibrillation, and she still feels very symptomatic, decreased energy, increased shortness of breath on exertion.    2/13/2025.  Patient presents for A-fib ablation.  She does note for the last couple days she has had a slight runny nose.  She also notes chronic cough in the, may be slightly worse today, was not productive.  Patient denies any history of fever recently or otherwise feeling poorly.  I offered her the option of rescheduling versus proceeding today I did discuss that procedure may be very slightly higher risk due to her ongoing runny nose/cough.  However given the intensity and severity of her A-fib symptoms, I did discuss that if the patient wishes to proceed, we would be okay to proceed as long she understood the slightly increased risk.  Patient wishes to proceed      No current facility-administered medications on file prior to encounter.     Current Outpatient Medications on File Prior to Encounter   Medication Sig Dispense Refill    aspirin 81 MG EC tablet Take 1 tablet by mouth Daily.      atenolol (TENORMIN) 25 MG tablet Take 1 tablet by mouth 2 (Two) Times a Day. 30 tablet 11    digoxin (LANOXIN) 125 MCG tablet Take 1 tablet by mouth Daily. 90 tablet 3    isosorbide mononitrate (IMDUR) 30 MG 24 hr tablet Take 1 tablet by mouth Daily. 90 tablet 1    Multiple  "Vitamins-Minerals (PRESERVISION AREDS PO) Take  by mouth.      Omega-3 Fatty Acids (FISH OIL) 1000 MG capsule capsule Take 2 capsules by mouth Daily With Breakfast.      pantoprazole (PROTONIX) 40 MG EC tablet TAKE ONE TABLET BY MOUTH DAILY      Pyridoxine HCl (VITAMIN B-6 PO) Take by mouth daily.      VITAMIN D PO Take  by mouth Daily.      levothyroxine (SYNTHROID, LEVOTHROID) 25 MCG tablet Take 0.5 tablets by mouth Daily. (Patient taking differently: Take 0.5 tablets by mouth Daily. Sunday, Mon, Wed and Friday - take half; take whole other days) 30 tablet 0       Allergies:  Allergies   Allergen Reactions    Diclofenac Sodium Unknown - Low Severity    Celecoxib Itching    Ibuprofen Itching    Niacin Other (See Comments)     unknown    Nsaids Itching       Objective:     Objective:  Vitals:    02/13/25 0748   BP: 113/57   BP Location: Right arm   Patient Position: Lying   Pulse: 90   Resp: 16   Temp: 98.3 °F (36.8 °C)   TempSrc: Oral   SpO2: 98%   Weight: 90 kg (198 lb 8 oz)   Height: 172.7 cm (68\")     Body mass index is 30.18 kg/m².    Gen: Well nourished; Alert & oriented  Skin: no visible rashes and no abnormalities with palpation  Eyes: no evidence of visual defects and normal sclera  Ears: no abnormalities.  Mouth: normal teeth and appropriately moist mucous membranes  Chest: clear to auscultation. There is normal respiratory effort and expansion.  CV: examination showed a irregular rhythm. S1 and S2 were normal.   Abd: no visible distension.   Neurologic:Cranial nerves appear intact; Sensation normal; no localizing signs      Lab Review:     Lab Results   Component Value Date    WBC 8.10 02/06/2025    HGB 11.8 (L) 02/06/2025    HCT 38.2 02/06/2025    MCV 97.2 (H) 02/06/2025     02/06/2025     Lab Results   Component Value Date    GLUCOSE 100 (H) 02/06/2025    BUN 12 02/06/2025    CO2 26.0 02/06/2025    CREATININE 0.83 02/06/2025    K 3.9 02/06/2025     02/06/2025     02/06/2025    CALCIUM " "9.7 02/06/2025     Lab Results   Component Value Date    MG 2.2 09/15/2024     No results found for: \"PHOS\"  Lab Results   Component Value Date    ALT 21 02/06/2025    AST 25 02/06/2025    ALKPHOS 109 02/06/2025    BILITOT 0.6 02/06/2025     Lab Results   Component Value Date    TRIG 81 06/24/2020    HDL 49 (L) 06/24/2020    LDL 84 06/24/2020     No results found for: \"PTT\", \"INR\"  Lab Results   Component Value Date    HGBA1C 5.8 (H) 11/08/2023     Lab Results   Component Value Date    TSH 0.032 (L) 09/15/2024         EKG:  Atrial fibrillation, heart rate 100    I reviewed the patient's new clinical results.  I personally viewed and interpreted the patient's EKG    Assessment/Plan:     Atrial fibrillation, persistent,  - On anticoagulation, no missed doses.  - Patient with slight runny nose for the past couple days, cough that might be slightly worse today.  Patient denies any fever denies otherwise feeling poorly.  -- I offered her the option of rescheduling versus proceeding today I did discuss that procedure may be very slightly higher risk due to her ongoing runny nose/cough.  However given the intensity and severity of her A-fib symptoms, I did discuss that if the patient wishes to proceed, we would be okay to proceed as long she understood the slightly increased risk.  Patient wishes to proceed        César Gregg MD  02/13/25  08:56 EST    "

## 2025-03-11 NOTE — PROGRESS NOTES
[facility]  Clinical Cardiac Electrophysiology     Date of Office Visit: 2024  Encounter Provider: César Gregg MD  Patient Name: Mellisa Sheldon  :1940    History of Presenting Illness     OFFICE VISIT  Mellisa Sheldon is a 84 y.o. female female with a past medical history of CAD with an anterior wall MI in  with PCI of the LAD followed by PCI of the RCA in . Her she has ischemic cardiomyopathy with recovered EF, atrial fibrillation s/p cardioversion, s/p PVI, humberto line X2 on 2025 who presents for follow-up evaluation.     She presents with daughter today.  Patient notes she is doing remarkably better since her ablation.  No longer having the palpitations rapid heart rate episodes that she did before.  She also notes increased energy, family also notes that she appears happier than before.    She also notes that she is been following with endocrinologist, and has recently been told that her thyroid levels are now close to normal.       Past Medical History     Past Medical History:   Diagnosis Date    Arrhythmia     Coronary artery disease     Disease of thyroid gland     GERD (gastroesophageal reflux disease)     Hyperlipidemia     Hypertension     Ischemic cardiomyopathy     Myocardial infarction     AIDA (obstructive sleep apnea) 2022    Home sleep study.  Weight 222 pounds.  Mild AIDA with AHI 10 events per hour.  No sleep-related hypoxia.     Past Surgical History:   Procedure Laterality Date    CARDIAC ELECTROPHYSIOLOGY PROCEDURE  2025    Procedure: Ablation atrial fibrillation;  Surgeon: César Gregg MD;  Location: [facility] INVASIVE LOCATION;  Service: Cardiovascular;;    CORONARY ANGIOPLASTY      angioplasties of the LAD and the right coronary artery both with stent placements    TONSILLECTOMY           Medications     Current Outpatient Medications on File Prior to Visit   Medication Sig Dispense Refill    aspirin 81 MG EC tablet Take 1 tablet by  mouth Daily.      atenolol (TENORMIN) 25 MG tablet Take 1 tablet by mouth 2 (Two) Times a Day. 30 tablet 11    atorvastatin (LIPITOR) 20 MG tablet TAKE 1 TABLET BY MOUTH DAILY 90 tablet 1    Eliquis 5 MG tablet tablet TAKE 1 TABLET BY MOUTH 2 TIMES A DAY 60 tablet 3    isosorbide mononitrate (IMDUR) 30 MG 24 hr tablet Take 1 tablet by mouth Daily. 90 tablet 1    levothyroxine (SYNTHROID, LEVOTHROID) 25 MCG tablet Take 0.5 tablets by mouth Daily. (Patient taking differently: Take 0.5 tablets by mouth Daily. Sunday, Mon, Wed and Friday - take half; take whole other days) 30 tablet 0    Multiple Vitamins-Minerals (PRESERVISION AREDS PO) Take  by mouth.      Omega-3 Fatty Acids (FISH OIL) 1000 MG capsule capsule Take 2 capsules by mouth Daily With Breakfast.      pantoprazole (PROTONIX) 40 MG EC tablet TAKE ONE TABLET BY MOUTH DAILY      Pyridoxine HCl (VITAMIN B-6 PO) Take by mouth daily.      VITAMIN D PO Take  by mouth Daily.       No current facility-administered medications on file prior to visit.           Allergies     Allergies   Allergen Reactions    Diclofenac Sodium Unknown - Low Severity    Celecoxib Itching    Ibuprofen Itching    Niacin Other (See Comments)     unknown    Nsaids Itching           Family History     Family History   Problem Relation Age of Onset    Cancer Mother     Dementia Sister            Social History     Social History     Socioeconomic History    Marital status:    Tobacco Use    Smoking status: Former     Current packs/day: 0.00     Average packs/day: 2.0 packs/day for 40.0 years (80.0 ttl pk-yrs)     Types: Cigarettes     Start date: 10/14/1969     Quit date: 10/14/2009     Years since quitting: 15.4    Smokeless tobacco: Never    Tobacco comments:     caffeine use : 2 cups daily.   Vaping Use    Vaping status: Never Used   Substance and Sexual Activity    Alcohol use: Never    Drug use: No    Sexual activity: Defer        Review of Systems: All systems reviewed. Pertinent  "positives identified in HPI. All other systems are negative.         Physical Examination     There were no vitals filed for this visit.    There is no height or weight on file to calculate BMI.     Gen: Well nourished; Alert  Skin: no visible rashes and no abnormalities with palpation  Eyes: no evidence of visual defects and normal sclera  Ears: no abnormalities.  Mouth: normal teeth and appropriately moist mucous membranes  Chest: clear to auscultation. There is normal respiratory effort and expansion.  CV: examination showed a irregular rhythm. S1 and S2 were normal.   Abd: no visible distension.   Neurologic:Cranial nerves appear intact; Sensation normal; no localizing signs        Laboratory Studies (Reviewed by provider)    Lab Results   Component Value Date    WBC 8.10 02/06/2025    HGB 11.8 (L) 02/06/2025    HCT 38.2 02/06/2025    MCV 97.2 (H) 02/06/2025     02/06/2025     Lab Results   Component Value Date    GLUCOSE 100 (H) 02/06/2025    BUN 12 02/06/2025    CO2 26.0 02/06/2025    CREATININE 0.83 02/06/2025    K 3.9 02/06/2025     02/06/2025     02/06/2025    CALCIUM 9.7 02/06/2025     Lab Results   Component Value Date    MG 2.2 09/15/2024     No results found for: \"PHOS\"  Lab Results   Component Value Date    ALT 21 02/06/2025    AST 25 02/06/2025    ALKPHOS 109 02/06/2025    BILITOT 0.6 02/06/2025     Lab Results   Component Value Date    TRIG 81 06/24/2020    HDL 49 (L) 06/24/2020    LDL 84 06/24/2020     No results found for: \"PTT\", \"INR\"  Lab Results   Component Value Date    HGBA1C 5.8 (H) 11/08/2023     Lab Results   Component Value Date    TSH 0.032 (L) 09/15/2024        Investigations (Reviewed by provider)    EKG 03/11/25:   ECG 12 Lead    Date/Time: 3/12/2025 2:05 PM  Performed by: César Gregg MD    Authorized by: César Gregg MD  Comparison: compared with previous ECG from 2/13/2025  Similar to previous ECG  Rhythm: sinus rhythm    Clinical impression: non-specific " ECG           EKG 10/23/24:   ECG 12 Lead     Date/Time: 10/23/2024 10:41 AM  Performed by: César Gregg MD     Authorized by: César Gregg MD  Comparison: compared with previous ECG from 2024  Comparison to previous ECG: Rate has slowed.  Otherwise unchanged.  Rhythm: atrial fibrillation           ECHOCARDIOGRAPHY: 2021:  Left ventricular ejection fraction appears to be 61 - 65%. Left ventricular systolic function is normal.  Left ventricular wall thickness is consistent with mild concentric hypertrophy  Left ventricular diastolic function is consistent with (grade I) impaired relaxation.  Normal right ventricular cavity size and systolic function noted.  The aortic valve leaflets are moderately calcified (aortic sclerosis)  There is no evidence of pericardial effusion        HOLTER/EVENT MONITORIN2024:  Monitor placed on patient by [facility] on 2024  at 14:23 EDT. Instructions were provided to patient on use of holter monitor and duration of monitoring.  The monitor was scanned on 2024. The patient was monitored for 14 days. Indications for this exam include PAROXYSMAL ATRIAL FIBRILLATION. Average HR:  85. Min HR:  48. Max HR:  197.      Study Findings     Patient diary submitted. Palpitations was reported during the monitoring period. Palpitations correlated with episodes of premature atrial contractions, premature ventricular contractions and atrial fibrillation. Patient reported frequent episodes of palpitations. No complications noted. The predominant rhythm noted during the testing period was sinus rhythm. Premature atrial contractions occured occasionally. Evidence of atrial fibrillation was noted. Sinoatrial node conduction was normal. No atrioventricular block noted.      Study Impressions     An abnormal monitor study.      40% afib burden.                     Subsequent event monitor on 10/23/2024:    An abnormal monitor study.    Patient noted to be in atrial fibrillation 100% of  the time.  Rate ranging from 45- 166 bpm (avg of 83 bpm     Atrial Fibrillation occurred continuously (100% burden), ranging from  bpm (avg of 83 bpm). Isolated VEs were rare (<1.0%), VE Couplets were rare (<1.0%), and no VE Triplets were present.          Assessment & Plan  # Atrial fibrillation  -- s/p PVI, humberto line X2 on 2/13/2025  -- [id number] Score: 6  - Anticoagulation with: Eliquis  - Currently doing extremely well, no recurrent episodes since her ablation.  - Patient remains on atenolol.  Patient does note some decreased energy, okay to cut it to 12.5 twice daily.  If after 30 days, no recurrent A-fib okay to discontinue  - I discussed that she can follow-up with me as needed, or make a scheduled appointment.  Patient is okay to follow-up with us as needed       Follow up PRN  Plan: Decrease atenolol in half. If after 30 days, no more afib, okay to dc.      Thank you for the kind referral and allowing me to participate in the care of your patient. We will continue to follow the patient in our clinic.     This time spent caring for Mellisa Sheldon on this date of service includes time spent by me in the following activities:preparing for the visit, reviewing tests, obtaining and/or reviewing a separately obtained history, performing a medically appropriate examination and/or evaluation, counseling and educating the patient/family/caregiver, documenting information in the medical record, and independently interpreting results and communicating that information with the patient/family/caregiver    César Gregg MD  Clinical Cardiac Electrophysiology  [facility]

## 2025-03-12 ENCOUNTER — OFFICE VISIT (OUTPATIENT)
Age: 85
End: 2025-03-12
Payer: MEDICARE

## 2025-03-12 VITALS
WEIGHT: 196.8 LBS | HEART RATE: 56 BPM | DIASTOLIC BLOOD PRESSURE: 68 MMHG | SYSTOLIC BLOOD PRESSURE: 126 MMHG | BODY MASS INDEX: 29.83 KG/M2 | HEIGHT: 68 IN

## 2025-03-12 DIAGNOSIS — I48.0 PAROXYSMAL ATRIAL FIBRILLATION: Primary | ICD-10-CM

## 2025-03-12 RX ORDER — METHIMAZOLE 5 MG/1
5 TABLET ORAL
COMMUNITY
Start: 2024-12-13 | End: 2025-03-13

## 2025-03-12 RX ORDER — ATENOLOL 25 MG/1
12.5 TABLET ORAL 2 TIMES DAILY
Start: 2025-03-12

## 2025-03-18 RX ORDER — ATENOLOL 25 MG/1
12.5 TABLET ORAL 2 TIMES DAILY
Qty: 30 TABLET | Refills: 5 | Status: SHIPPED | OUTPATIENT
Start: 2025-03-18 | End: 2025-03-21

## 2025-03-21 ENCOUNTER — OFFICE VISIT (OUTPATIENT)
Dept: CARDIOLOGY | Facility: CLINIC | Age: 85
End: 2025-03-21
Payer: MEDICARE

## 2025-03-21 VITALS
BODY MASS INDEX: 29.4 KG/M2 | DIASTOLIC BLOOD PRESSURE: 80 MMHG | HEART RATE: 57 BPM | HEIGHT: 68 IN | SYSTOLIC BLOOD PRESSURE: 126 MMHG | WEIGHT: 194 LBS

## 2025-03-21 DIAGNOSIS — E78.2 MIXED HYPERLIPIDEMIA: Primary | ICD-10-CM

## 2025-03-21 PROCEDURE — 3079F DIAST BP 80-89 MM HG: CPT | Performed by: NURSE PRACTITIONER

## 2025-03-21 PROCEDURE — 3074F SYST BP LT 130 MM HG: CPT | Performed by: NURSE PRACTITIONER

## 2025-03-21 PROCEDURE — 1160F RVW MEDS BY RX/DR IN RCRD: CPT | Performed by: NURSE PRACTITIONER

## 2025-03-21 PROCEDURE — 99214 OFFICE O/P EST MOD 30 MIN: CPT | Performed by: NURSE PRACTITIONER

## 2025-03-21 PROCEDURE — 1159F MED LIST DOCD IN RCRD: CPT | Performed by: NURSE PRACTITIONER

## 2025-03-21 PROCEDURE — 93000 ELECTROCARDIOGRAM COMPLETE: CPT | Performed by: NURSE PRACTITIONER

## 2025-03-21 RX ORDER — ATENOLOL 25 MG/1
12.5 TABLET ORAL DAILY
Qty: 45 TABLET | Refills: 5 | Status: SHIPPED | OUTPATIENT
Start: 2025-03-21

## 2025-03-21 NOTE — PROGRESS NOTES
Date of Office Visit: 2025  Encounter Provider: MIKE Zamorano  Place of Service: [facility] CARDIOLOGY  Patient Name: Mellisa Sheldon  :1940    Chief complaint: Coronary artery disease  HPI: Mellisa Sheldon is a 84 y.o. female who is a patient of Dr. Henriquez and is known to me from previous.  She has a history of coronary disease with anterior wall MI in .  She had a PCI of the LAD at that time followed by PCI to the RCA in .  She has ischemic cardiomyopathy with recovered EF.  She also has a history of atrial fibrillation and is status post post cardioversion.  Previously she had severe epistaxis on Eliquis and was resulted in being on aspirin only.  In August of last year she developed severe palpitations she wore a Holter monitor.  Her thyroid medicine had also been stopped.  She was having at least a 40% A-fib burden we increased her metoprolol to 50 mg in the morning and 25 mg in the evening.  She came to see Dr. Goldstein for follow up in December her heart rates were up as high as 160.  She was referred to electrophysiology.    On  she underwent a PVI ablation for atrial fibrillation.  She is back on Eliquis and remains on atenolol.  She is here for follow-up today.  She still has some fatigue during the day.  She did not really notice a difference in this with cutting back her atenolol to half a pill twice a day.  She is not having any palpitations has not noticed that her Apple Watch telling her her heart rate is irregular.  Blood pressure is well-controlled sometimes she gets some dizziness with standing.  Orthostatics today were mild lying she sat at 132/60 sitting 116/60 and standing 110/60.    Previous testing and notes have been reviewed by me.    Latest Reference Range & Units Most Recent   Sodium 136 - 145 mmol/L 140  25 13:30   Potassium 3.5 - 5.2 mmol/L 3.9  25 13:30   Chloride 98 - 107 mmol/L 104  25 13:30   CO2  22.0 - 29.0 mmol/L 26.0  2/6/25 13:30   CO2 22 - 29 mmol/L 25 (E)  11/2/22 15:47   Anion Gap 5.0 - 15.0 mmol/L 10.0  2/6/25 13:30   BUN 8 - 23 mg/dL 12  2/6/25 13:30   Creatinine 0.57 - 1.00 mg/dL 0.83  2/6/25 13:30   BUN/Creatinine Ratio 7.0 - 25.0  14.5  2/6/25 13:30   eGFR >60.0 mL/min/1.73 69.2  2/6/25 13:30   Est GFR by Clearance >60 /1.73 m2 53 (L) (E)  11/2/22 15:47   Glucose 65 - 99 mg/dL 100 (H)  2/6/25 13:30   Calcium 8.6 - 10.5 mg/dL 9.7  2/6/25 13:30   Magnesium 1.6 - 2.4 mg/dL 2.2  9/15/24 11:43   Alkaline Phosphatase 39 - 117 U/L 109  2/6/25 13:30   Total Protein 6.0 - 8.5 g/dL 6.9  2/6/25 13:30   Albumin 3.5 - 5.2 g/dL 4.0  2/6/25 13:30   Globulin gm/dL 2.9  2/6/25 13:30   A/G Ratio g/dL 1.4  2/6/25 13:30   AST (SGOT) 1 - 32 U/L 25  2/6/25 13:30   ALT (SGPT) 1 - 33 U/L 21  2/6/25 13:30   Total Bilirubin 0.0 - 1.2 mg/dL 0.6  2/6/25 13:30   eGFR Non African Am >60 mL/min/1.73 70  10/11/21 11:24   eGFR  Am >60 /1.73 m2 >60 (E)  11/2/22 15:47   (L): Data is abnormally low  (H): Data is abnormally high  (E): External lab result     Latest Reference Range & Units Most Recent   Total Cholesterol 0 - 199 mg/dL 149 (E)  6/24/20 16:40   HDL Cholesterol >49 mg/dL 49 (L) (E)  6/24/20 16:40   LDL Cholesterol  0 - 100 mg/dL 84 (E)  6/24/20 16:40   VLDL Cholesterol 5 - 40 mg/dL 16 (E)  6/24/20 16:40   Triglycerides 0 - 149 mg/dL 81 (E)  6/24/20 16:40   Chol/HDL Ratio RATIO 3.0 (E)  6/24/20 16:40   (L): Data is abnormally low  (E): External lab result  November 2021 echocardiogram    Left ventricular ejection fraction appears to be 61 - 65%. Left ventricular systolic function is normal.  Left ventricular wall thickness is consistent with mild concentric hypertrophy  Left ventricular diastolic function is consistent with (grade I) impaired relaxation.  Normal right ventricular cavity size and systolic function noted.  The aortic valve leaflets are moderately calcified (aortic sclerosis)  There is no evidence of  pericardial effusion       Past Medical History:   Diagnosis Date    Arrhythmia     Coronary artery disease     Disease of thyroid gland     GERD (gastroesophageal reflux disease)     Hyperlipidemia     Hypertension     Ischemic cardiomyopathy     Myocardial infarction     AIDA (obstructive sleep apnea) 07/14/2022    Home sleep study.  Weight 222 pounds.  Mild AIDA with AHI 10 events per hour.  No sleep-related hypoxia.       Past Surgical History:   Procedure Laterality Date    CARDIAC ELECTROPHYSIOLOGY PROCEDURE  2/13/2025    Procedure: Ablation atrial fibrillation;  Surgeon: César Gregg MD;  Location: [facility] INVASIVE LOCATION;  Service: Cardiovascular;;    CORONARY ANGIOPLASTY      angioplasties of the LAD and the right coronary artery both with stent placements    TONSILLECTOMY         Social History     Socioeconomic History    Marital status:    Tobacco Use    Smoking status: Former     Current packs/day: 0.00     Average packs/day: 2.0 packs/day for 40.0 years (80.0 ttl pk-yrs)     Types: Cigarettes     Start date: 10/14/1969     Quit date: 10/14/2009     Years since quitting: 15.4     Passive exposure: Past    Smokeless tobacco: Never    Tobacco comments:     caffeine use : 2 cups daily.   Vaping Use    Vaping status: Never Used   Substance and Sexual Activity    Alcohol use: Never    Drug use: No    Sexual activity: Defer       Family History   Problem Relation Age of Onset    Cancer Mother     Dementia Sister        Review of Systems   Constitutional: Positive for malaise/fatigue. Negative for diaphoresis.   Cardiovascular:  Negative for chest pain, claudication, dyspnea on exertion, irregular heartbeat, leg swelling, near-syncope, orthopnea, palpitations, paroxysmal nocturnal dyspnea and syncope.   Respiratory:  Negative for cough, shortness of breath and sleep disturbances due to breathing.    Musculoskeletal:  Negative for falls.   Neurological:  Negative for dizziness and weakness.  "  Psychiatric/Behavioral:  Negative for altered mental status and substance abuse.        Allergies   Allergen Reactions    Diclofenac Sodium Unknown - Low Severity    Celecoxib Itching    Ibuprofen Itching    Niacin Other (See Comments)     unknown    Nsaids Itching         Current Outpatient Medications:     aspirin 81 MG EC tablet, Take 1 tablet by mouth Daily., Disp: , Rfl:     atenolol (TENORMIN) 25 MG tablet, Take 0.5 tablets by mouth 2 (Two) Times a Day., Disp: 30 tablet, Rfl: 5    atorvastatin (LIPITOR) 20 MG tablet, TAKE 1 TABLET BY MOUTH DAILY, Disp: 90 tablet, Rfl: 1    Eliquis 5 MG tablet tablet, TAKE 1 TABLET BY MOUTH 2 TIMES A DAY, Disp: 60 tablet, Rfl: 3    isosorbide mononitrate (IMDUR) 30 MG 24 hr tablet, Take 1 tablet by mouth Daily., Disp: 90 tablet, Rfl: 1    Multiple Vitamins-Minerals (PRESERVISION AREDS PO), Take  by mouth., Disp: , Rfl:     Niacin (VITAMIN B-3 PO), Take 1 tablet by mouth Daily., Disp: , Rfl:     Omega-3 Fatty Acids (FISH OIL) 1000 MG capsule capsule, Take 2 capsules by mouth Daily With Breakfast., Disp: , Rfl:     pantoprazole (PROTONIX) 40 MG EC tablet, TAKE ONE TABLET BY MOUTH DAILY, Disp: , Rfl:     Pyridoxine HCl (VITAMIN B-6 PO), Take by mouth daily., Disp: , Rfl:     VITAMIN D PO, Take  by mouth Daily., Disp: , Rfl:     methIMAzole (TAPAZOLE) 5 MG tablet, Take 1 tablet by mouth. 1 tab 4 days, 2 tabs 3 days, Disp: , Rfl:         Objective:     Vitals:    03/21/25 1148   BP: 126/80   BP Location: Left arm   Patient Position: Sitting   Pulse: 57   Weight: 88 kg (194 lb)   Height: 172.7 cm (68\")     Body mass index is 29.5 kg/m².    PHYSICAL EXAM:    Constitutional:       General: Not in acute distress.     Appearance: Normal appearance. Well-developed.   Eyes:      Pupils: Pupils are equal, round, and reactive to light.   HENT:      Head: Normocephalic.   Neck:      Vascular: No carotid bruit or JVD.   Pulmonary:      Effort: Pulmonary effort is normal. No tachypnea.      " Breath sounds: Normal breath sounds. No wheezing. No rales.   Cardiovascular:      Normal rate. Regular rhythm.      No gallop.    Pulses:     Intact distal pulses.   Edema:     Peripheral edema absent.   Abdominal:      General: Bowel sounds are normal.      Palpations: Abdomen is soft.      Tenderness: There is no abdominal tenderness.   Musculoskeletal: Normal range of motion.      Cervical back: Normal range of motion and neck supple. No edema. Skin:     General: Skin is warm and dry.   Neurological:      Mental Status: Alert and oriented to person, place, and time.           ECG 12 Lead    Date/Time: 3/21/2025 12:50 PM  Performed by: Blanca Forrester APRN    Authorized by: Blanca Forrester APRN  Comparison: compared with previous ECG from 3/12/2025  Similar to previous ECG  Rhythm: sinus rhythm  Rate: normal  QRS axis: normal    Clinical impression: normal ECG              Assessment/Plan:      1.  Paroxysmal atrial fibrillation-status post ablation.  Tolerating Eliquis 5 mg twice a day.  No issues with epistaxis this time.  Atenolol was cut back last visit due to fatigue we will reduce it to a half a pill 12.5 mg at bedtime.    2.  Coronary artery disease aspirin 81 mg daily and atorvastatin 20 mg daily.  No complaints of angina EKG stable.    3.  Dyslipidemia-continue atorvastatin 20 mg daily.  Will check a fasting lipid panel.  She has not had a lipid done since 2020.    Follow-up in 6 months with Dr. Goldstein         Your medication list            Accurate as of March 21, 2025 12:21 PM. If you have any questions, ask your nurse or doctor.                CONTINUE taking these medications        Instructions Last Dose Given Next Dose Due   aspirin 81 MG EC tablet      Take 1 tablet by mouth Daily.       atenolol 25 MG tablet  Commonly known as: TENORMIN      Take 0.5 tablets by mouth 2 (Two) Times a Day.       atorvastatin 20 MG tablet  Commonly known as: LIPITOR      TAKE 1 TABLET BY MOUTH DAILY        Eliquis 5 MG tablet tablet  Generic drug: apixaban      TAKE 1 TABLET BY MOUTH 2 TIMES A DAY       fish oil 1000 MG capsule capsule      Take 2 capsules by mouth Daily With Breakfast.       isosorbide mononitrate 30 MG 24 hr tablet  Commonly known as: IMDUR      Take 1 tablet by mouth Daily.       methIMAzole 5 MG tablet  Commonly known as: TAPAZOLE      Take 1 tablet by mouth. 1 tab 4 days, 2 tabs 3 days       multivitamin with minerals tablet tablet      Take  by mouth.       pantoprazole 40 MG EC tablet  Commonly known as: PROTONIX      TAKE ONE TABLET BY MOUTH DAILY       VITAMIN B-3 PO      Take 1 tablet by mouth Daily.       VITAMIN B-6 PO      Take by mouth daily.       VITAMIN D PO      Take  by mouth Daily.                  As always, it has been a pleasure to participate in your patient's care.      Sincerely,     Blanca MCKEON

## 2025-03-25 ENCOUNTER — TELEPHONE (OUTPATIENT)
Dept: CARDIOLOGY | Age: 85
End: 2025-03-25

## 2025-03-25 NOTE — TELEPHONE ENCOUNTER
Caller: Mellisa Sheldon    Relationship: Self    Best call back number: [phone]    What is the best time to reach you: ANYTIME    What was the call regarding: PT IS CALLING TO SEE WHERE ELSE SHE CAN DO THE TEST THAT DR. CARVALHO ORDERED

## 2025-03-26 ENCOUNTER — LAB (OUTPATIENT)
Dept: LAB | Facility: HOSPITAL | Age: 85
End: 2025-03-26
Payer: MEDICARE

## 2025-03-26 DIAGNOSIS — E78.2 MIXED HYPERLIPIDEMIA: ICD-10-CM

## 2025-03-26 LAB
CHOLEST SERPL-MCNC: 150 MG/DL (ref 0–200)
HDLC SERPL-MCNC: 55 MG/DL (ref 40–60)
LDLC SERPL CALC-MCNC: 76 MG/DL (ref 0–100)
LDLC/HDLC SERPL: 1.34 {RATIO}
TRIGL SERPL-MCNC: 106 MG/DL (ref 0–150)
VLDLC SERPL-MCNC: 19 MG/DL (ref 5–40)

## 2025-03-26 PROCEDURE — 36415 COLL VENOUS BLD VENIPUNCTURE: CPT

## 2025-03-26 PROCEDURE — 80061 LIPID PANEL: CPT

## 2025-03-26 NOTE — TELEPHONE ENCOUNTER
I spoke to the pt. SHe is aware that she can have her labs drawn at the [facility].

## 2025-05-08 RX ORDER — ISOSORBIDE MONONITRATE 30 MG/1
30 TABLET, EXTENDED RELEASE ORAL DAILY
Qty: 90 TABLET | Refills: 1 | Status: SHIPPED | OUTPATIENT
Start: 2025-05-08

## 2025-06-10 RX ORDER — APIXABAN 5 MG/1
5 TABLET, FILM COATED ORAL 2 TIMES DAILY
Qty: 90 TABLET | Refills: 1 | Status: SHIPPED | OUTPATIENT
Start: 2025-06-10

## 2025-08-04 RX ORDER — ATORVASTATIN CALCIUM 20 MG/1
20 TABLET, FILM COATED ORAL DAILY
Qty: 90 TABLET | Refills: 1 | Status: SHIPPED | OUTPATIENT
Start: 2025-08-04

## 2025-08-26 ENCOUNTER — TELEPHONE (OUTPATIENT)
Dept: CARDIOLOGY | Age: 85
End: 2025-08-26
Payer: MEDICARE

## 2025-08-29 ENCOUNTER — OFFICE VISIT (OUTPATIENT)
Age: 85
End: 2025-08-29
Payer: MEDICARE

## 2025-08-29 ENCOUNTER — LAB (OUTPATIENT)
Dept: LAB | Facility: HOSPITAL | Age: 85
End: 2025-08-29
Payer: MEDICARE

## 2025-08-29 VITALS
WEIGHT: 199 LBS | DIASTOLIC BLOOD PRESSURE: 70 MMHG | BODY MASS INDEX: 30.16 KG/M2 | SYSTOLIC BLOOD PRESSURE: 106 MMHG | HEART RATE: 121 BPM | HEIGHT: 68 IN

## 2025-08-29 DIAGNOSIS — I48.92 ATRIAL FLUTTER WITH RAPID VENTRICULAR RESPONSE: ICD-10-CM

## 2025-08-29 DIAGNOSIS — I48.0 PAROXYSMAL ATRIAL FIBRILLATION: Primary | ICD-10-CM

## 2025-08-29 DIAGNOSIS — I48.19 PERSISTENT ATRIAL FIBRILLATION: ICD-10-CM

## 2025-08-29 DIAGNOSIS — I48.0 PAROXYSMAL ATRIAL FIBRILLATION: ICD-10-CM

## 2025-08-29 LAB
ALBUMIN SERPL-MCNC: 3.8 G/DL (ref 3.5–5.2)
ALBUMIN/GLOB SERPL: 1.5 G/DL
ALP SERPL-CCNC: 101 U/L (ref 39–117)
ALT SERPL W P-5'-P-CCNC: 65 U/L (ref 1–33)
ANION GAP SERPL CALCULATED.3IONS-SCNC: 12.7 MMOL/L (ref 5–15)
AST SERPL-CCNC: 64 U/L (ref 1–32)
BASOPHILS # BLD AUTO: 0.04 10*3/MM3 (ref 0–0.2)
BASOPHILS NFR BLD AUTO: 0.7 % (ref 0–1.5)
BILIRUB SERPL-MCNC: 0.9 MG/DL (ref 0–1.2)
BUN SERPL-MCNC: 16 MG/DL (ref 8–23)
BUN/CREAT SERPL: 16.3 (ref 7–25)
CALCIUM SPEC-SCNC: 9.6 MG/DL (ref 8.6–10.5)
CHLORIDE SERPL-SCNC: 108 MMOL/L (ref 98–107)
CO2 SERPL-SCNC: 21.3 MMOL/L (ref 22–29)
CREAT SERPL-MCNC: 0.98 MG/DL (ref 0.57–1)
DEPRECATED RDW RBC AUTO: 45 FL (ref 37–54)
EGFRCR SERPLBLD CKD-EPI 2021: 56.7 ML/MIN/1.73
EOSINOPHIL # BLD AUTO: 0.08 10*3/MM3 (ref 0–0.4)
EOSINOPHIL NFR BLD AUTO: 1.3 % (ref 0.3–6.2)
ERYTHROCYTE [DISTWIDTH] IN BLOOD BY AUTOMATED COUNT: 12.4 % (ref 12.3–15.4)
GLOBULIN UR ELPH-MCNC: 2.5 GM/DL
GLUCOSE SERPL-MCNC: 102 MG/DL (ref 65–99)
HCT VFR BLD AUTO: 37.1 % (ref 34–46.6)
HGB BLD-MCNC: 11.9 G/DL (ref 12–15.9)
IMM GRANULOCYTES # BLD AUTO: 0.01 10*3/MM3 (ref 0–0.05)
IMM GRANULOCYTES NFR BLD AUTO: 0.2 % (ref 0–0.5)
LYMPHOCYTES # BLD AUTO: 1.01 10*3/MM3 (ref 0.7–3.1)
LYMPHOCYTES NFR BLD AUTO: 16.7 % (ref 19.6–45.3)
MCH RBC QN AUTO: 31.9 PG (ref 26.6–33)
MCHC RBC AUTO-ENTMCNC: 32.1 G/DL (ref 31.5–35.7)
MCV RBC AUTO: 99.5 FL (ref 79–97)
MONOCYTES # BLD AUTO: 0.41 10*3/MM3 (ref 0.1–0.9)
MONOCYTES NFR BLD AUTO: 6.8 % (ref 5–12)
NEUTROPHILS NFR BLD AUTO: 4.49 10*3/MM3 (ref 1.7–7)
NEUTROPHILS NFR BLD AUTO: 74.3 % (ref 42.7–76)
NRBC BLD AUTO-RTO: 0 /100 WBC (ref 0–0.2)
PLATELET # BLD AUTO: 157 10*3/MM3 (ref 140–450)
PMV BLD AUTO: 11.5 FL (ref 6–12)
POTASSIUM SERPL-SCNC: 4.2 MMOL/L (ref 3.5–5.2)
PROT SERPL-MCNC: 6.3 G/DL (ref 6–8.5)
RBC # BLD AUTO: 3.73 10*6/MM3 (ref 3.77–5.28)
SODIUM SERPL-SCNC: 142 MMOL/L (ref 136–145)
WBC NRBC COR # BLD AUTO: 6.04 10*3/MM3 (ref 3.4–10.8)

## 2025-08-29 PROCEDURE — 85025 COMPLETE CBC W/AUTO DIFF WBC: CPT

## 2025-08-29 PROCEDURE — 36415 COLL VENOUS BLD VENIPUNCTURE: CPT

## 2025-08-29 PROCEDURE — 80053 COMPREHEN METABOLIC PANEL: CPT

## (undated) DEVICE — 1 X VERSACROSS TRANSSEPTAL SHEATH (INCLUDING  1 X J-TIP GUIDEWIRE); 1 X VERSACROSS RF WIRE (INCLUDING 1 X CONNECTOR CABLE (SINGLE USE)); 1 X DISPERSIVE ELECTRODE: Brand: VERSACROSS ACCESS SOLUTION

## (undated) DEVICE — PINNACLE INTRODUCER SHEATH: Brand: PINNACLE

## (undated) DEVICE — Device: Brand: DECANAV

## (undated) DEVICE — OCTA,PERSEID,2-2-2-2-2,F-CURVE: Brand: OCTARAY MAPPING CATHETER

## (undated) DEVICE — SYS CLS VASC/VENI VASCADE MVP 6TO12F

## (undated) DEVICE — LOU EP: Brand: MEDLINE INDUSTRIES, INC.

## (undated) DEVICE — PROB S-CATH TEMP ESOPH 10F

## (undated) DEVICE — Device: Brand: VIZIGO

## (undated) DEVICE — BI-DIRECTIONAL NAVIGATION CATHETER, NAV, D-F: Brand: QDOT MICRO

## (undated) DEVICE — Device: Brand: REFERENCE PATCH CARTO 3

## (undated) DEVICE — Device: Brand: SOUNDSTAR

## (undated) DEVICE — Device: Brand: SMARTABLATE